# Patient Record
Sex: FEMALE | Race: ASIAN | Employment: FULL TIME | URBAN - METROPOLITAN AREA
[De-identification: names, ages, dates, MRNs, and addresses within clinical notes are randomized per-mention and may not be internally consistent; named-entity substitution may affect disease eponyms.]

---

## 2017-10-20 ENCOUNTER — OFFICE VISIT (OUTPATIENT)
Dept: FAMILY MEDICINE | Facility: CLINIC | Age: 27
End: 2017-10-20
Payer: COMMERCIAL

## 2017-10-20 VITALS
WEIGHT: 125.3 LBS | TEMPERATURE: 97.5 F | HEART RATE: 74 BPM | OXYGEN SATURATION: 100 % | HEIGHT: 63 IN | SYSTOLIC BLOOD PRESSURE: 102 MMHG | BODY MASS INDEX: 22.2 KG/M2 | DIASTOLIC BLOOD PRESSURE: 64 MMHG

## 2017-10-20 DIAGNOSIS — Z23 NEED FOR PROPHYLACTIC VACCINATION AND INOCULATION AGAINST INFLUENZA: ICD-10-CM

## 2017-10-20 DIAGNOSIS — Z00.00 ENCOUNTER FOR ROUTINE ADULT HEALTH EXAMINATION WITHOUT ABNORMAL FINDINGS: Primary | ICD-10-CM

## 2017-10-20 LAB
ANION GAP SERPL CALCULATED.3IONS-SCNC: 7 MMOL/L (ref 3–14)
BUN SERPL-MCNC: 6 MG/DL (ref 7–30)
CALCIUM SERPL-MCNC: 8.5 MG/DL (ref 8.5–10.1)
CHLORIDE SERPL-SCNC: 108 MMOL/L (ref 94–109)
CHOLEST SERPL-MCNC: 138 MG/DL
CO2 SERPL-SCNC: 24 MMOL/L (ref 20–32)
CREAT SERPL-MCNC: 0.57 MG/DL (ref 0.52–1.04)
GFR SERPL CREATININE-BSD FRML MDRD: >90 ML/MIN/1.7M2
GLUCOSE SERPL-MCNC: 77 MG/DL (ref 70–99)
HDLC SERPL-MCNC: 57 MG/DL
LDLC SERPL CALC-MCNC: 64 MG/DL
NONHDLC SERPL-MCNC: 81 MG/DL
POTASSIUM SERPL-SCNC: 4.1 MMOL/L (ref 3.4–5.3)
SODIUM SERPL-SCNC: 139 MMOL/L (ref 133–144)
TRIGL SERPL-MCNC: 87 MG/DL

## 2017-10-20 PROCEDURE — 99395 PREV VISIT EST AGE 18-39: CPT | Mod: 25 | Performed by: NURSE PRACTITIONER

## 2017-10-20 PROCEDURE — 80061 LIPID PANEL: CPT | Performed by: NURSE PRACTITIONER

## 2017-10-20 PROCEDURE — 90686 IIV4 VACC NO PRSV 0.5 ML IM: CPT | Performed by: NURSE PRACTITIONER

## 2017-10-20 PROCEDURE — 90471 IMMUNIZATION ADMIN: CPT | Performed by: NURSE PRACTITIONER

## 2017-10-20 PROCEDURE — 80048 BASIC METABOLIC PNL TOTAL CA: CPT | Performed by: NURSE PRACTITIONER

## 2017-10-20 PROCEDURE — 36415 COLL VENOUS BLD VENIPUNCTURE: CPT | Performed by: NURSE PRACTITIONER

## 2017-10-20 NOTE — PROGRESS NOTES
"   SUBJECTIVE:   CC: Escobar Baron is an 26 year old woman who presents for preventive health visit.     Healthy Habits:    Do you get at least three servings of calcium containing foods daily (dairy, green leafy vegetables, etc.)? {YES/NO, DAIRY INTAKE:822353::\"yes\"}    Amount of exercise or daily activities, outside of work: {AMOUNT EXERCISE:272324}    Problems taking medications regularly {Yes /No default:223749::\"No\"}    Medication side effects: {Yes /No default.:014211::\"No\"}    Have you had an eye exam in the past two years? {YESNOBLANK:060811}    Do you see a dentist twice per year? {YESNOBLANK:543539}    Do you have sleep apnea, excessive snoring or daytime drowsiness?{YESNOBLANK:324787}    {Outside tests to abstract? :108217}    {additional problems to add (Optional):374843}    Today's PHQ-2 Score:   PHQ-2 ( 1999 Pfizer) 10/19/2016 8/21/2015   Q1: Little interest or pleasure in doing things 0 0   Q2: Feeling down, depressed or hopeless 0 0   PHQ-2 Score 0 0     {PHQ-2 LOOK IN ASSESSMENTS (Optional) :416197}  Abuse: Current or Past(Physical, Sexual or Emotional)- {YES/NO/NA:039769}  Do you feel safe in your environment - {YES/NO/NA:814977}    Social History   Substance Use Topics     Smoking status: Never Smoker     Smokeless tobacco: Never Used     Alcohol use No     {ETOH AUDIT:042857}    Reviewed orders with patient.  Reviewed health maintenance and updated orders accordingly - {Yes/No:887434::\"Yes\"}  {Chronicprobdata (Optional):448174}    {Mammo Decision Support (Optional):447859}    Pertinent mammograms are reviewed under the imaging tab.  History of abnormal Pap smear: {PAP HX:859315}    Reviewed and updated as needed this visit by clinical staff         Reviewed and updated as needed this visit by Provider        {HISTORY OPTIONS (Optional):876018}    ROS:  {FEMALE PREVENTATIVE ROS:669923}    OBJECTIVE:   There were no vitals taken for this visit.  EXAM:  {Exam Choices:772428}    ASSESSMENT/PLAN: " "  {Dia Picklist:020122}    COUNSELING:   {FEMALE COUNSELING MESSAGES:152626::\"Reviewed preventive health counseling, as reflected in patient instructions\"}    {BP Counseling- Complete if BP >= 120/80  (Optional):052349}     reports that she has never smoked. She has never used smokeless tobacco.  {Tobacco Cessation -- Complete if patient is a smoker (Optional):253815}  Estimated body mass index is 21.51 kg/(m^2) as calculated from the following:    Height as of 10/19/16: 5' 3\" (1.6 m).    Weight as of 10/19/16: 121 lb 6.4 oz (55.1 kg).   {Weight Management Plan (ACO) Complete if BMI is abnormal-  Ages 18-64  BMI >24.9.  Age 65+ with BMI <23 or >30 (Optional):331869}    Counseling Resources:  ATP IV Guidelines  Pooled Cohorts Equation Calculator  Breast Cancer Risk Calculator  FRAX Risk Assessment  ICSI Preventive Guidelines  Dietary Guidelines for Americans, 2010  USDA's MyPlate  ASA Prophylaxis  Lung CA Screening    ALESSANDRO Mackey Ra [facility]  "

## 2017-10-20 NOTE — MR AVS SNAPSHOT
After Visit Summary   10/20/2017    Escobar Baron    MRN: [id number]           Patient Information     Date Of Birth          1990        Visit Information        Provider Department      10/20/2017 8:00 AM Katja Hein Ra, APRN CNP [facility]        Today's Diagnoses     Encounter for routine adult health examination without abnormal findings    -  1      Care Instructions    Let me know if you decide you would like to proceed with the nexplanon implant for contraception.          Follow-ups after your visit        Who to contact     If you have questions or need follow up information about today's clinic visit or your schedule please contact [facility] directly at [phone].  Normal or non-critical lab and imaging results will be communicated to you by MyChart, letter or phone within 4 business days after the clinic has received the results. If you do not hear from us within 7 days, please contact the clinic through [employer] or phone. If you have a critical or abnormal lab result, we will notify you by phone as soon as possible.  Submit refill requests through [employer] or call your pharmacy and they will forward the refill request to us. Please allow 3 business days for your refill to be completed.          Additional Information About Your Visit        MyChart Information     [employer] gives you secure access to your electronic health record. If you see a primary care provider, you can also send messages to your care team and make appointments. If you have questions, please call your primary care clinic.  If you do not have a primary care provider, please call [phone] and they will assist you.        Care EveryWhere ID     This is your Care EveryWhere ID. This could be used by other organizations to access your [place] medical records  [id number]        Your Vitals Were     Pulse Temperature Height Pulse Oximetry BMI (Body Mass Index)       74 97.5  " F (36.4  C) (Oral) 5' 3.25\" (1.607 m) 100% 22.02 kg/m2        Blood Pressure from Last 3 Encounters:   10/20/17 102/64   10/19/16 102/60   08/21/15 110/70    Weight from Last 3 Encounters:   10/20/17 125 lb 4.8 oz (56.8 kg)   10/19/16 121 lb 6.4 oz (55.1 kg)   08/21/15 119 lb 4.8 oz (54.1 kg)              We Performed the Following     Basic metabolic panel     LIPID REFLEX TO DIRECT LDL PANEL        Primary Care Provider Office Phone # Fax #    Carolyne Turcios [phone]       [address] [facility] [address]        Equal Access to Services     ALMITA CAMACHO : Hadii aad ku hadasho Sorajesh, waaxda luqadaha, qaybta kaalmada adeegyada, kristen mensah . So [facility] [phone].    ATENCIÓN: Si habla español, tiene a almanzar disposición servicios gratuitos de asistencia lingüística. Llame al [phone].    We comply with applicable federal civil rights laws and [place] laws. We do not discriminate on the basis of race, color, national origin, age, disability, sex, sexual orientation, or gender identity.            Thank you!     Thank you for choosing [facility]  for your care. Our goal is always to provide you with excellent care. Hearing back from our patients is one way we can continue to improve our services. Please take a few minutes to complete the written survey that you may receive in the mail after your visit with us. Thank you!             Your Updated Medication List - Protect others around you: Learn how to safely use, store and throw away your medicines at www.disposemymeds.org.      Notice  As of 10/20/2017  8:16 AM    You have not been prescribed any medications.      "

## 2017-10-20 NOTE — PROGRESS NOTES

## 2017-10-20 NOTE — NURSING NOTE
"Chief Complaint   Patient presents with     Physical       Initial /64 (BP Location: Right arm, Cuff Size: Adult Regular)  Pulse 74  Temp 97.5  F (36.4  C) (Oral)  Ht 5' 3.25\" (1.607 m)  Wt 125 lb 4.8 oz (56.8 kg)  SpO2 100%  BMI 22.02 kg/m2 Estimated body mass index is 22.02 kg/(m^2) as calculated from the following:    Height as of this encounter: 5' 3.25\" (1.607 m).    Weight as of this encounter: 125 lb 4.8 oz (56.8 kg).  Medication Reconciliation: complete   Pee Christine CMA      "

## 2017-10-20 NOTE — PROGRESS NOTES
SUBJECTIVE:   CC: Escobar Baron is an 26 year old woman who presents for preventive health visit.     Physical   Annual:     Getting at least 3 servings of Calcium per day::  Yes    Bi-annual eye exam::  Yes    Dental care twice a year::  Yes    Sleep apnea or symptoms of sleep apnea::  None    Diet::  Regular (no restrictions) and Other    Frequency of exercise::  None    Taking medications regularly::  Yes    Medication side effects::  Not applicable    Additional concerns today::  No      No concerns.  Last year discussed nexplanon.  Still interested, but still deciding if they want one more child.        Today's PHQ-2 Score: PHQ-2 (  Pfizer) 10/20/2017   Q1: Little interest or pleasure in doing things 0   Q2: Feeling down, depressed or hopeless 0   PHQ-2 Score 0   Q1: Little interest or pleasure in doing things Not at all   Q2: Feeling down, depressed or hopeless Not at all   PHQ-2 Score 0       Abuse: Current or Past(Physical, Sexual or Emotional)- No  Do you feel safe in your environment - Yes    Social History   Substance Use Topics     Smoking status: Never Smoker     Smokeless tobacco: Never Used     Alcohol use No     The patient does not drink >3 drinks per day nor >7 drinks per week.    Reviewed orders with patient.  Reviewed health maintenance and updated orders accordingly - Yes  Patient Active Problem List   Diagnosis     Migraine headache     Carrier of group B Streptococcus     Vitamin D deficiency     CARDIOVASCULAR SCREENING; LDL GOAL LESS THAN 160     Encounter for supervision of other normal pregnancy     Indication for care in labor and delivery, antepartum      contractions     Indication for care in labor or delivery     Right lumbar radiculopathy     Past Surgical History:   Procedure Laterality Date     ENT SURGERY      wisdom tooth extraction     NO HISTORY OF SURGERY         Social History   Substance Use Topics     Smoking status: Never Smoker     Smokeless tobacco:  Never Used     Alcohol use No     Family History   Problem Relation Age of Onset     CANCER Sister       age 4 kidney cancer     CANCER Maternal Grandmother      Family History Negative No family hx of              Mammogram not appropriate for this patient based on age.    Pertinent mammograms are reviewed under the imaging tab.  History of abnormal Pap smear: NO - age 21-29 PAP every 3 years recommended    Reviewed and updated as needed this visit by clinical staff         Reviewed and updated as needed this visit by Provider          Review of Systems  C: NEGATIVE for fever, chills, change in weight  I: NEGATIVE for worrisome rashes, moles or lesions  E: NEGATIVE for vision changes or irritation  ENT: NEGATIVE for ear, mouth and throat problems  R: NEGATIVE for significant cough or SOB  B: NEGATIVE for masses, tenderness or discharge  CV: NEGATIVE for chest pain, palpitations or peripheral edema  GI: NEGATIVE for nausea, abdominal pain, heartburn, or change in bowel habits  : NEGATIVE for unusual urinary or vaginal symptoms. Periods are regular.  M: NEGATIVE for significant arthralgias or myalgia  N: NEGATIVE for weakness, dizziness or paresthesias  P: NEGATIVE for changes in mood or affect     OBJECTIVE:   There were no vitals taken for this visit.  Physical Exam  GENERAL: healthy, alert and no distress  EYES: Eyes grossly normal to inspection, PERRL and conjunctivae and sclerae normal  HENT: ear canals and TM's normal, nose and mouth without ulcers or lesions  NECK: no adenopathy, no asymmetry, masses, or scars and thyroid normal to palpation  RESP: lungs clear to auscultation - no rales, rhonchi or wheezes  BREAST: normal without masses, tenderness or nipple discharge and no palpable axillary masses or adenopathy  CV: regular rate and rhythm, normal S1 S2, no S3 or S4, no murmur, click or rub, no peripheral edema and peripheral pulses strong  ABDOMEN: soft, nontender, no hepatosplenomegaly, no masses  "and bowel sounds normal  MS: no gross musculoskeletal defects noted, no edema  SKIN: no suspicious lesions or rashes  NEURO: Normal strength and tone, mentation intact and speech normal  PSYCH: mentation appears normal, affect normal/bright    ASSESSMENT/PLAN:   1. Encounter for routine adult health examination without abnormal findings  26 y.o. Female, here for WWE.    Will schedule if she wishes to have nexplanon placed.  Pt agrees with plan and verbalized understanding.  - LIPID REFLEX TO DIRECT LDL PANEL  - Basic metabolic panel    COUNSELING:  Reviewed preventive health counseling, as reflected in patient instructions       Family planning         reports that she has never smoked. She has never used smokeless tobacco.    Estimated body mass index is 21.51 kg/(m^2) as calculated from the following:    Height as of 10/19/16: 5' 3\" (1.6 m).    Weight as of 10/19/16: 121 lb 6.4 oz (55.1 kg).         Counseling Resources:  ATP IV Guidelines  Pooled Cohorts Equation Calculator  Breast Cancer Risk Calculator  FRAX Risk Assessment  ICSI Preventive Guidelines  Dietary Guidelines for Americans, 2010  USDA's MyPlate  ASA Prophylaxis  Lung CA Screening    ALESSANDRO Mackey Ra, CNP  [facility]swers for HPI/ROS submitted by the patient on 10/20/2017   PHQ-2 Score: 0    "

## 2018-03-15 ENCOUNTER — OFFICE VISIT (OUTPATIENT)
Dept: FAMILY MEDICINE | Facility: CLINIC | Age: 28
End: 2018-03-15
Payer: COMMERCIAL

## 2018-03-15 VITALS
SYSTOLIC BLOOD PRESSURE: 115 MMHG | HEART RATE: 75 BPM | WEIGHT: 128.9 LBS | HEIGHT: 63 IN | OXYGEN SATURATION: 99 % | DIASTOLIC BLOOD PRESSURE: 64 MMHG | RESPIRATION RATE: 19 BRPM | BODY MASS INDEX: 22.84 KG/M2 | TEMPERATURE: 98.7 F

## 2018-03-15 DIAGNOSIS — M25.561 ACUTE PAIN OF RIGHT KNEE: Primary | ICD-10-CM

## 2018-03-15 PROCEDURE — 99213 OFFICE O/P EST LOW 20 MIN: CPT | Performed by: PHYSICIAN ASSISTANT

## 2018-03-15 NOTE — PROGRESS NOTES
"  SUBJECTIVE:   Escobar Baron is a 27 year old female who presents to clinic today for the following health issues:    Musculoskeletal problem/pain      Duration: 3 days     Description  Location: right knee     Intensity:  moderate, severe    Accompanying signs and symptoms: Swelling, pain in knee \"stiff\" feeling     History  Previous similar problem: no   Previous evaluation:  none    Precipitating or alleviating factors:  Trauma or overuse: YES- slid on ice a few weeks ago, not sure if related.    Aggravating factors include: going from sitting to standing, bending it backwards     Therapies tried and outcome: Ibuprofen, not effective     -Patient is a 28yo female with a recent slip on ice (without actual fall) around 2 weeks ago (but no further pain that day)  -now with new onset swelling and pain in that knee  -She notes when moving from sitting to standing it is painful  -increased pain when knees are bent  -not painful with walking; Stairs are \"horrible\" (feels like cant put pressure)  -unsure about a specific location of pain  -started ibuprofen yesterday; did start icing as well    Problem list and histories reviewed & adjusted, as indicated.  Additional history: as documented    Patient Active Problem List   Diagnosis     Migraine headache     Carrier of group B Streptococcus     Vitamin D deficiency     CARDIOVASCULAR SCREENING; LDL GOAL LESS THAN 160     Encounter for supervision of other normal pregnancy     Indication for care in labor and delivery, antepartum      contractions     Indication for care in labor or delivery     Right lumbar radiculopathy     Past Surgical History:   Procedure Laterality Date     ENT SURGERY  2010    wisdom tooth extraction     NO HISTORY OF SURGERY         Social History   Substance Use Topics     Smoking status: Never Smoker     Smokeless tobacco: Never Used     Alcohol use No     Family History   Problem Relation Age of Onset     CANCER Sister       " "age 4 kidney cancer     CANCER Maternal Grandmother      Family History Negative No family hx of            Reviewed and updated as needed this visit by clinical staff       Reviewed and updated as needed this visit by Provider         ROS:  Constitutional, HEENT, cardiovascular, pulmonary, gi and gu systems are negative, except as otherwise noted.    OBJECTIVE:     /64 (BP Location: Right arm, Patient Position: Chair, Cuff Size: Adult Regular)  Pulse 75  Temp 98.7  F (37.1  C) (Tympanic)  Resp 19  Ht 5' 3\" (1.6 m)  Wt 128 lb 14.4 oz (58.5 kg)  SpO2 99%  BMI 22.83 kg/m2  Body mass index is 22.83 kg/(m^2).  GENERAL: healthy, alert and no distress  MS: No warmth or redness. Mild appreciable synovial swelling along the lateral knee space. This is the area of joint line tenderness as well. No medial joint line tenderness, no ligamentous laxity or cruciate instability.      Diagnostic Test Results:  none     ASSESSMENT/PLAN:   1. Acute pain of right knee  Mild effusion noted with positive meniscal testing lateral. Begin supportive care with nsaid and ice over the next few days. If not improving start physical therapy. Consider ortho eval if this is not fruitful - she will call in this case. No need for follow up appt.   - [facility] PT, HAND, AND CHIROPRACTIC REFERRAL    Dami Green PA-C  [facility]  "

## 2018-03-15 NOTE — MR AVS SNAPSHOT
After Visit Summary   3/15/2018    Escobar Baron    MRN: [id number]           Patient Information     Date Of Birth          1990        Visit Information        Provider Department      3/15/2018 1:00 PM Dami Green PA-C [facility]        Today's Diagnoses     Acute pain of right knee    -  1       Follow-ups after your visit        Additional Services     [facility], AND CH[facility]RACTIC REFERRAL       **This order will print in the [facility] Scheduling Office**    Physical Therapy, Hand Therapy and Chiropractic Care are available through:    *[facility]  *[facility]  *[facility]    Call one number to schedule at any of the above locations: [phone].    Your provider has referred you to: Physical Therapy at [facility] or [facility]    Indication/Reason for Referral: Knee Pain  Onset of Illness: acute  Therapy Orders: Evaluate and Treat  Special Programs: None  Special Request: None    Alethea Claire      Additional Comments for the Therapist or Chiropractor:     Please be aware that coverage of these services is subject to the terms and limitations of your health insurance plan.  Call member services at your health plan with any benefit or coverage questions.      Please bring the following to your appointment:    *Your personal calendar for scheduling future appointments  *Comfortable clothing                  Who to contact     If you have questions or need follow up information about today's clinic visit or your schedule please contact [facility] directly at [phone].  Normal or non-critical lab and imaging results will be communicated to you by MyChart, letter or phone within 4 business days after the clinic has received the results. If you do not hear from us within 7 days, please contact the clinic through MyChart or phone. If you have a critical or abnormal lab result, we will notify you by phone  "as soon as possible.  Submit refill requests through [employer] or call your pharmacy and they will forward the refill request to us. Please allow 3 business days for your refill to be completed.          Additional Information About Your Visit        [employer] Information     [employer] gives you secure access to your electronic health record. If you see a primary care provider, you can also send messages to your care team and make appointments. If you have questions, please call your primary care clinic.  If you do not have a primary care provider, please call [phone] and they will assist you.        Care EveryWhere ID     This is your Care EveryWhere ID. This could be used by other organizations to access your [place] medical records  [id number]        Your Vitals Were     Pulse Temperature Respirations Height Pulse Oximetry BMI (Body Mass Index)    75 98.7  F (37.1  C) (Tympanic) 19 5' 3\" (1.6 m) 99% 22.83 kg/m2       Blood Pressure from Last 3 Encounters:   03/15/18 115/64   10/20/17 102/64   10/19/16 102/60    Weight from Last 3 Encounters:   03/15/18 128 lb 14.4 oz (58.5 kg)   10/20/17 125 lb 4.8 oz (56.8 kg)   10/19/16 121 lb 6.4 oz (55.1 kg)              We Performed the Following     [facility], AND CH[facility]RACTIC REFERRAL        Primary Care Provider Office Phone # Fax #    Katja ALESSANDRO [place] [facility] [phone]       [address] [facility] [address]        Equal Access to Services     [facility] Hospital[facility] AH: Hadii aad ku hadasho Soomaali, waaxda luqadaha, qaybta kaalmada adeegyada, kristen mensah . So [facility] [phone].    ATENCIÓN: Si habla español, tiene a almanzar disposición servicios gratuitos de asistencia lingüística. Llame al [phone].    We comply with applicable federal civil rights laws and [place] laws. We do not discriminate on the basis of race, color, national origin, age, disability, sex, sexual orientation, or gender identity.            Thank you!  "    Thank you for choosing [facility]  for your care. Our goal is always to provide you with excellent care. Hearing back from our patients is one way we can continue to improve our services. Please take a few minutes to complete the written survey that you may receive in the mail after your visit with us. Thank you!             Your Updated Medication List - Protect others around you: Learn how to safely use, store and throw away your medicines at www.disposemymeds.org.          This list is accurate as of 3/15/18  1:28 PM.  Always use your most recent med list.                   Brand Name Dispense Instructions for use Diagnosis    ALLEGRA PO

## 2018-03-27 ENCOUNTER — THERAPY VISIT (OUTPATIENT)
Dept: PHYSICAL THERAPY | Facility: CLINIC | Age: 28
End: 2018-03-27
Payer: COMMERCIAL

## 2018-03-27 DIAGNOSIS — M25.561 ACUTE PAIN OF RIGHT KNEE: Primary | ICD-10-CM

## 2018-03-27 PROCEDURE — 97161 PT EVAL LOW COMPLEX 20 MIN: CPT | Mod: GP | Performed by: PHYSICAL THERAPIST

## 2018-03-27 PROCEDURE — 97110 THERAPEUTIC EXERCISES: CPT | Mod: GP | Performed by: PHYSICAL THERAPIST

## 2018-03-27 ASSESSMENT — ACTIVITIES OF DAILY LIVING (ADL)
HOW_WOULD_YOU_RATE_THE_CURRENT_FUNCTION_OF_YOUR_KNEE_DURING_YOUR_USUAL_DAILY_ACTIVITIES_ON_A_SCALE_FROM_0_TO_100_WITH_100_BEING_YOUR_LEVEL_OF_KNEE_FUNCTION_PRIOR_TO_YOUR_INJURY_AND_0_BEING_THE_INABILITY_TO_PERFORM_ANY_OF_YOUR_USUAL_DAILY_ACTIVITIES?: 70
PAIN: THE SYMPTOM AFFECTS MY ACTIVITY SLIGHTLY
STAND: ACTIVITY IS MINIMALLY DIFFICULT
GIVING WAY, BUCKLING OR SHIFTING OF KNEE: I DO NOT HAVE THE SYMPTOM
WEAKNESS: THE SYMPTOM AFFECTS MY ACTIVITY MODERATELY
RISE FROM A CHAIR: ACTIVITY IS MINIMALLY DIFFICULT
KNEEL ON THE FRONT OF YOUR KNEE: ACTIVITY IS SOMEWHAT DIFFICULT
HOW_WOULD_YOU_RATE_THE_OVERALL_FUNCTION_OF_YOUR_KNEE_DURING_YOUR_USUAL_DAILY_ACTIVITIES?: ABNORMAL
KNEE_ACTIVITY_OF_DAILY_LIVING_SCORE: 50
RAW_SCORE: 35
KNEE_ACTIVITY_OF_DAILY_LIVING_SUM: 35
GO UP STAIRS: ACTIVITY IS VERY DIFFICULT
AS_A_RESULT_OF_YOUR_KNEE_INJURY,_HOW_WOULD_YOU_RATE_YOUR_CURRENT_LEVEL_OF_DAILY_ACTIVITY?: NORMAL
SWELLING: THE SYMPTOM AFFECTS MY ACTIVITY SEVERELY
SIT WITH YOUR KNEE BENT: ACTIVITY IS VERY DIFFICULT
WALK: ACTIVITY IS SOMEWHAT DIFFICULT
STIFFNESS: I HAVE THE SYMPTOM BUT IT DOES NOT AFFECT MY ACTIVITY
SQUAT: ACTIVITY IS VERY DIFFICULT
LIMPING: THE SYMPTOM AFFECTS MY ACTIVITY MODERATELY
GO DOWN STAIRS: ACTIVITY IS VERY DIFFICULT

## 2018-03-27 NOTE — MR AVS SNAPSHOT
After Visit Summary   3/27/2018    Escobar Baron    MRN: [id number]           Patient Information     Date Of Birth          1990        Visit Information        Provider Department      3/27/2018 6:30 AM Starla Ramsay PT [facility] [place] [facility]        Today's Diagnoses     Acute pain of right knee    -  1       Follow-ups after your visit        Your next 10 appointments already scheduled     Apr 03, 2018  7:10 AM CDT   SALBADOR Extremity with Starla Ramsay PT   [facility] [place] [facility] ([facility] [place])    [address]  [place] [address] [phone]              Who to contact     If you have questions or need follow up information about today's clinic visit or your schedule please contact [facility] directly at [phone].  Normal or non-critical lab and imaging results will be communicated to you by MyChart, letter or phone within 4 business days after the clinic has received the results. If you do not hear from us within 7 days, please contact the clinic through MyChart or phone. If you have a critical or abnormal lab result, we will notify you by phone as soon as possible.  Submit refill requests through [employer] or call your pharmacy and they will forward the refill request to us. Please allow 3 business days for your refill to be completed.          Additional Information About Your Visit        MyChart Information     [employer] gives you secure access to your electronic health record. If you see a primary care provider, you can also send messages to your care team and make appointments. If you have questions, please call your primary care clinic.  If you do not have a primary care provider, please call [phone] and they will assist you.        Care EveryWhere ID     This is your Care EveryWhere ID. This could be used by other organizations to access your [facility] medical records  [id number]          Blood Pressure from Last 3 Encounters:   03/15/18 115/64   10/20/17 102/64   10/19/16 102/60    Weight from Last 3 Encounters:   03/15/18 58.5 kg (128 lb 14.4 oz)   10/20/17 56.8 kg (125 lb 4.8 oz)   10/19/16 55.1 kg (121 lb 6.4 oz)              We Performed the Following     HC PT EVAL, LOW COMPLEXITY     SALBADOR INITIAL EVAL REPORT     THERAPEUTIC EXERCISES        Primary Care Provider Office Phone # Fax #    Katja De Dios Angel Luis, APRN [facility] [phone]       [address]  [facility] [address]        Equal Access to Services     [facility] JANICE : Hadii aad ku hadasho Soomaali, waaxda luqadaha, qaybta kaalmada adeegyada, kristen mensah . So [facility] [phone].    ATENCIÓN: Si habla español, tiene a almanzar disposición servicios gratuitos de asistencia lingüística. Llame al [phone].    We comply with applicable federal civil rights laws and [place] laws. We do not discriminate on the basis of race, color, national origin, age, disability, sex, sexual orientation, or gender identity.            Thank you!     Thank you for choosing [facility]  for your care. Our goal is always to provide you with excellent care. Hearing back from our patients is one way we can continue to improve our services. Please take a few minutes to complete the written survey that you may receive in the mail after your visit with us. Thank you!             Your Updated Medication List - Protect others around you: Learn how to safely use, store and throw away your medicines at www.disposemymeds.org.          This list is accurate as of 3/27/18  1:54 PM.  Always use your most recent med list.                   Brand Name Dispense Instructions for use Diagnosis    ALLEGRA PO

## 2018-03-27 NOTE — PROGRESS NOTES
[facility] Initial Evaluation  Subjective:  HPI Comments: Pt has history of LBP with associated sciatica down the R LE.      Patient is a 27 year old female presenting with rehab right knee hpi. The history is provided by the patient. No  was used.   Escobar Baron is a 27 year old female with a right knee condition.  Condition occurred with:  Insidious onset.  Condition occurred: for unknown reasons.  This is a new condition  2 weeks ago.    Patient reports pain:  Anterior, posterior and lateral.  Radiates to:  Low back.  Pain is described as shooting and is constant and reported as 7/10.  Associated symptoms:  Edema, loss of strength and loss of motion/stiffness. Pain is the same all the time.  Symptoms are exacerbated by descending stairs, ascending stairs, walking, bending/squatting, certain positions and kneeling and relieved by ice and NSAID's.  Since onset symptoms are unchanged.        General health as reported by patient is good.  Pertinent medical history includes:  None.  Medical allergies: no.  Other surgeries include:  None reported.  Current medications:  None as reported by the patient.  Current occupation is Works for insurance company, on weekends works as a .  Patient is working in normal job without restrictions.  Primary job tasks include:  Prolonged sitting.    Barriers include:  Stairs.    Red flags:  None as reported by the patient.                        Objective:  System                                           Hip Evaluation    Hip Strength:    Flexion:   Left: 5/5   Pain:  Right: 5/5   Pain:                    Extension:  Left: 5/5  Pain:Right: 5/5    Pain:    Abduction:  Left: 5/5     Pain:Right: 4+/5    Pain:    Internal Rotation:  Left: 5/5    Pain:Right: 5/5   Pain:  External Rotation:  Left: 5/5   Pain:  Right: 5-/5   Pain:                     Knee Evaluation:  ROM:    AROM    Hyperextension:  Left:  3    Right: 1    Flexion:  Left: 125   Right:        Strength:     Extension:  Left: 5/5   Pain:      Right: 5/5   Pain:  Flexion:  Left: 5/5   Pain:      Right: 5-/5   Pain:        Ligament Testing:    Varus 0:  Right:  Neg  Varus 30:  Right:  Pos  Valgus 0:  Right:  Neg  Valgus 30:  Right:  Neg  Anterior Drawer:  Right:  Neg  Posterior Drawer: Right:  Neg  Lachman's:  Right:  Neg  Special Tests:       Right knee negative for the following special tests:  Meniscal              General     ROS    Assessment/Plan:    Patient is a 27 year old female with right side knee complaints.    Patient has the following significant findings with corresponding treatment plan.                Diagnosis 1:  Right knee pain  Pain -  hot/cold therapy, US, manual therapy, splint/taping/bracing/orthotics, education and home program  Decreased strength - therapeutic exercise, therapeutic activities and home program  Inflammation - cold therapy and self management/home program  Impaired gait - gait training and home program  Impaired muscle performance - neuro re-education and home program  Decreased function - therapeutic activities and home program    Therapy Evaluation Codes:   1) History comprised of:   Personal factors that impact the plan of care:      None.    Comorbidity factors that impact the plan of care are:      None.     Medications impacting care: None.  2) Examination of Body Systems comprised of:   Body structures and functions that impact the plan of care:      Knee.   Activity limitations that impact the plan of care are:      Squatting/kneeling and Stairs.  3) Clinical presentation characteristics are:    Stable/Uncomplicated.  4) Decision-Making    Moderate complexity using standardized patient assessment instrument and/or measureable assessment of functional outcome.  Cumulative Therapy Evaluation is: Low complexity.    Previous and current functional limitations:  (See Goal Flow Sheet for this information)    Short term and Long term goals:  (See Goal Flow Sheet for this information)     Communication ability:  Patient appears to be able to clearly communicate and understand verbal and written communication and follow directions correctly.  Treatment Explanation - The following has been discussed with the patient:   RX ordered/plan of care  Anticipated outcomes  Possible risks and side effects  This patient would benefit from PT intervention to resume normal activities.   Rehab potential is good.    Frequency:  1 X week, once daily  Duration:  for 3 weeks tapering to 2 X a month over 4-6 weeks  Discharge Plan:  Achieve all LTG.  Independent in home treatment program.  Reach maximal therapeutic benefit.    Please refer to the daily flowsheet for treatment today, total treatment time and time spent performing 1:1 timed codes.

## 2018-09-10 ENCOUNTER — TELEPHONE (OUTPATIENT)
Dept: FAMILY MEDICINE | Facility: CLINIC | Age: 28
End: 2018-09-10

## 2018-09-10 NOTE — TELEPHONE ENCOUNTER
Panel Management Review      Patient has the following on her problem list: None      Composite cancer screening  Chart review shows that this patient is due/due soon for the following Pap Smear  Summary:    Patient is due/failing the following:   PAP    Action needed:   Patient needs office visit for Pap.    Type of outreach:    Sent [employer] message.    Questions for provider review:    None                                                                                                                                    Ceci Ceballos CMA       Chart routed to Care Team .

## 2018-09-10 NOTE — LETTER
[facility]  [address] [address] [phone]       September 27, 2018    Escobar Baron  [address]  [place] [facility] [address]    Dear Escobar,    We care about your health and have reviewed your health plan and are making recommendations based on this review, to optimize your health.    You are in particular need of attention regarding:  -Cervical Cancer Screening    We are recommending that you:                                                                                                                 -schedule a PAP SMEAR EXAM which is due.  Please disregard this reminder if you have had this exam elsewhere within the last year.  It would be helpful for us to have a copy of your recent pap smear report in our file so that we can best coordinate your care.      If you are under/uninsured, we recommend you contact the [facility]. They offer pap smears at no charge or on a sliding fee charge. You can schedule with them at [phone]. Please have them send us the results.                                                                                              In addition, here is a list of due or overdue Health Maintenance reminders.    Health Maintenance Due   Topic Date Due     Flu Vaccine (1) 09/01/2018     Pap Smear - every 3 years  08/21/2018     Depression Assessment 2 - yearly  10/20/2018       To address the above recommendations, we encourage you to contact us at [phone], via [employer] or by contacting Central Scheduling toll free at [phone] 24 hours a day. They will assist you with finding the most convenient time and location.    Thank you for trusting [facility] and we appreciate the opportunity to serve you.  We look forward to supporting your healthcare needs in the future.    Healthy Regards,    Your [facility] Team

## 2018-10-22 ENCOUNTER — HEALTH MAINTENANCE LETTER (OUTPATIENT)
Age: 28
End: 2018-10-22

## 2018-10-29 ENCOUNTER — OFFICE VISIT (OUTPATIENT)
Dept: FAMILY MEDICINE | Facility: CLINIC | Age: 28
End: 2018-10-29
Payer: COMMERCIAL

## 2018-10-29 VITALS
SYSTOLIC BLOOD PRESSURE: 102 MMHG | BODY MASS INDEX: 23.23 KG/M2 | TEMPERATURE: 98.6 F | RESPIRATION RATE: 15 BRPM | WEIGHT: 131.1 LBS | HEIGHT: 63 IN | DIASTOLIC BLOOD PRESSURE: 60 MMHG | HEART RATE: 72 BPM | OXYGEN SATURATION: 99 %

## 2018-10-29 DIAGNOSIS — G89.29 CHRONIC BILATERAL LOW BACK PAIN WITH RIGHT-SIDED SCIATICA: ICD-10-CM

## 2018-10-29 DIAGNOSIS — Z00.00 ENCOUNTER FOR ROUTINE ADULT HEALTH EXAMINATION WITHOUT ABNORMAL FINDINGS: Primary | ICD-10-CM

## 2018-10-29 DIAGNOSIS — N89.8 VAGINAL DISCHARGE: ICD-10-CM

## 2018-10-29 DIAGNOSIS — Z23 NEED FOR PROPHYLACTIC VACCINATION AND INOCULATION AGAINST INFLUENZA: ICD-10-CM

## 2018-10-29 DIAGNOSIS — M54.41 CHRONIC BILATERAL LOW BACK PAIN WITH RIGHT-SIDED SCIATICA: ICD-10-CM

## 2018-10-29 LAB
SPECIMEN SOURCE: NORMAL
WET PREP SPEC: NORMAL

## 2018-10-29 PROCEDURE — 87210 SMEAR WET MOUNT SALINE/INK: CPT | Performed by: NURSE PRACTITIONER

## 2018-10-29 PROCEDURE — 99213 OFFICE O/P EST LOW 20 MIN: CPT | Mod: 25 | Performed by: NURSE PRACTITIONER

## 2018-10-29 PROCEDURE — 99395 PREV VISIT EST AGE 18-39: CPT | Mod: 25 | Performed by: NURSE PRACTITIONER

## 2018-10-29 PROCEDURE — 90686 IIV4 VACC NO PRSV 0.5 ML IM: CPT | Performed by: NURSE PRACTITIONER

## 2018-10-29 PROCEDURE — 90471 IMMUNIZATION ADMIN: CPT | Performed by: NURSE PRACTITIONER

## 2018-10-29 PROCEDURE — G0145 SCR C/V CYTO,THINLAYER,RESCR: HCPCS | Performed by: NURSE PRACTITIONER

## 2018-10-29 ASSESSMENT — ENCOUNTER SYMPTOMS
JOINT SWELLING: 0
HEARTBURN: 0
CONSTIPATION: 0
PALPITATIONS: 0
WEAKNESS: 0
DIZZINESS: 0
DIARRHEA: 0
NERVOUS/ANXIOUS: 0
EYE PAIN: 0
FEVER: 0
ARTHRALGIAS: 0
PARESTHESIAS: 0
MYALGIAS: 0
ABDOMINAL PAIN: 0
HEMATURIA: 0
HEMATOCHEZIA: 0
COUGH: 0
FREQUENCY: 0
NAUSEA: 0
DYSURIA: 0
HEADACHES: 0
SORE THROAT: 0
SHORTNESS OF BREATH: 0
CHILLS: 0

## 2018-10-29 NOTE — MR AVS SNAPSHOT
After Visit Summary   10/29/2018    Escobar Baron    MRN: [id number]           Patient Information     Date Of Birth          1990        Visit Information        Provider Department      10/29/2018 8:00 AM Katja Hein Ra, [facility]ANDRO [facility]        Today's Diagnoses     Encounter for routine adult health examination without abnormal findings    -  1    Vaginal discharge        Chronic bilateral low back pain with right-sided sciatica        Need for prophylactic vaccination and inoculation against influenza          Care Instructions      Preventive Health Recommendations  Female Ages 26 - 39  Yearly exam:   See your health care provider every year in order to    Review health changes.     Discuss preventive care.      Review your medicines if you your doctor has prescribed any.    Until age 30: Get a Pap test every three years (more often if you have had an abnormal result).    After age 30: Talk to your doctor about whether you should have a Pap test every 3 years or have a Pap test with HPV screening every 5 years.   You do not need a Pap test if your uterus was removed (hysterectomy) and you have not had cancer.  You should be tested each year for STDs (sexually transmitted diseases), if you're at risk.   Talk to your provider about how often to have your cholesterol checked.  If you are at risk for diabetes, you should have a diabetes test (fasting glucose).  Shots: Get a flu shot each year. Get a tetanus shot every 10 years.   Nutrition:     Eat at least 5 servings of fruits and vegetables each day.    Eat whole-grain bread, whole-wheat pasta and brown rice instead of white grains and rice.    Get adequate Calcium and Vitamin D.     Lifestyle    Exercise at least 150 minutes a week (30 minutes a day, 5 days of the week). This will help you control your weight and prevent disease.    Limit alcohol to one drink per day.    No smoking.     Wear sunscreen to prevent  skin cancer.    See your dentist every six months for an exam and cleaning.            Follow-ups after your visit        Additional Services     ORTHO  REFERRAL       [facility] is referring you to the Orthopedic  Services at [facility].       The  Representative will assist you in the coordination of your Orthopedic and Musculoskeletal Care as prescribed by your physician.    The  Representative will call you within 1 business day to help schedule your appointment, or you may contact the  Representative at:    All areas ~ [phone]     Type of Referral : Spine: Lumbar: Medical Spine/Non-Surgical Specialist        Timeframe requested: Routine    Coverage of these services is subject to the terms and limitations of your health insurance plan.  Please call member services at your health plan with any benefit or coverage questions.      If X-rays, CT or MRI's have been performed, please contact the facility where they were done to arrange for , prior to your scheduled appointment.  Please bring this referral request to your appointment and present it to your specialist.                  Who to contact     If you have questions or need follow up information about today's clinic visit or your schedule please contact [facility] directly at [phone].  Normal or non-critical lab and imaging results will be communicated to you by MyChart, letter or phone within 4 business days after the clinic has received the results. If you do not hear from us within 7 days, please contact the clinic through MyChart or phone. If you have a critical or abnormal lab result, we will notify you by phone as soon as possible.  Submit refill requests through [employer] or call your pharmacy and they will forward the refill request to us. Please allow 3 business days for your refill to be completed.          Additional Information  "About Your Visit        MyChart Information     [employer] gives you secure access to your electronic health record. If you see a primary care provider, you can also send messages to your care team and make appointments. If you have questions, please call your primary care clinic.  If you do not have a primary care provider, please call [phone] and they will assist you.        Care EveryWhere ID     This is your Care EveryWhere ID. This could be used by other organizations to access your [facility] [place] medical records  [id number]        Your Vitals Were     Pulse Temperature Respirations Height Last Period Pulse Oximetry    72 98.6  F (37  C) (Oral) 15 5' 3\" (1.6 m) 10/08/2018 (Approximate) 99%    BMI (Body Mass Index)                   23.22 kg/m2            Blood Pressure from Last 3 Encounters:   10/29/18 102/60   03/15/18 115/64   10/20/17 102/64    Weight from Last 3 Encounters:   10/29/18 131 lb 1.6 oz (59.5 kg)   03/15/18 128 lb 14.4 oz (58.5 kg)   10/20/17 125 lb 4.8 oz (56.8 kg)              We Performed the Following     FLU VACCINE, SPLIT VIRUS, IM (QUADRIVALENT) [68955]- >3 YRS     OFFICE/OUTPT VISIT,EST,LEVL III     ORTHO  REFERRAL     Pap imaged thin layer screen reflex to HPV if ASCUS - recommend age 25 - 29     Vaccine Administration, Initial [68818]     Wet prep        Primary Care Provider Office Phone # Fax #    Katja ALESSANDRO [facility] [phone] [address] [facility] [address]        Equal Access to Services     [facility] JANICE : Hadii aad ku hadasho Soomaali, waaxda luqadaha, qaybta kaalmada kristen velazquez. So [facility] [phone].    ATENCIÓN: Si habla español, tiene a almanzar disposición servicios gratuitos de asistencia lingüística. Llame al [phone].    We comply with applicable federal civil rights laws and [place] laws. We do not discriminate on the basis of race, color, national origin, age, disability, sex, sexual " orientation, or gender identity.            Thank you!     Thank you for choosing [facility]  for your care. Our goal is always to provide you with excellent care. Hearing back from our patients is one way we can continue to improve our services. Please take a few minutes to complete the written survey that you may receive in the mail after your visit with us. Thank you!             Your Updated Medication List - Protect others around you: Learn how to safely use, store and throw away your medicines at www.disposemymeds.org.      Notice  As of 10/29/2018  8:42 AM    You have not been prescribed any medications.

## 2018-10-29 NOTE — PROGRESS NOTES
SUBJECTIVE:   CC: Escobar Baron is an 27 year old woman who presents for preventive health visit.     Physical   Annual:     Getting at least 3 servings of Calcium per day:  Yes    Bi-annual eye exam:  Yes    Dental care twice a year:  Yes    Sleep apnea or symptoms of sleep apnea:  None    Diet:  Regular (no restrictions) and Vegetarian/vegan    Frequency of exercise:  None    Taking medications regularly:  Yes    Medication side effects:  Not applicable and Other    Additional concerns today:  No      Vaginal discharge  No pain.  Increased discharge.  Sexually active.  No change in partner.  Uses condoms.  Normal periods.     Back pain  Chronic issue with low back pain and sciatic nerve pain.  Desk job.  Long walks help.  Has completed PT in the past.  Symptoms worsening over the past year.  Symptoms now about 3 times weekly.    Today's PHQ-2 Score:   PHQ-2 (  [employer]) 10/29/2018   Q1: Little interest or pleasure in doing things 0   Q2: Feeling down, depressed or hopeless 0   PHQ-2 Score 0   Q1: Little interest or pleasure in doing things Not at all   Q2: Feeling down, depressed or hopeless Not at all   PHQ-2 Score 0       Abuse: Current or Past(Physical, Sexual or Emotional)- No  Do you feel safe in your environment - Yes    Social History   Substance Use Topics     Smoking status: Never Smoker     Smokeless tobacco: Never Used     Alcohol use No     Alcohol Use 10/29/2018   If you drink alcohol do you typically have greater than 3 drinks per day OR greater than 7 drinks per week? No       Reviewed orders with patient.  Reviewed health maintenance and updated orders accordingly - Yes  Patient Active Problem List   Diagnosis     Migraine headache     Carrier of group B Streptococcus     Vitamin D deficiency     CARDIOVASCULAR SCREENING; LDL GOAL LESS THAN 160     Encounter for supervision of other normal pregnancy     Indication for care in labor and delivery, antepartum      contractions      Indication for care in labor or delivery     Right lumbar radiculopathy     Acute pain of right knee     Past Surgical History:   Procedure Laterality Date     ENT SURGERY  2010    wisdom tooth extraction     NO HISTORY OF SURGERY         Social History   Substance Use Topics     Smoking status: Never Smoker     Smokeless tobacco: Never Used     Alcohol use No     Family History   Problem Relation Age of Onset     Cancer Sister       age 4 kidney cancer     Cancer Maternal Grandmother      Family History Negative No family hx of            Mammogram not appropriate for this patient based on age.    Pertinent mammograms are reviewed under the imaging tab.  History of abnormal Pap smear: NO - age 21-29 PAP every 3 years recommended  PAP / HPV 2015   PAP NIL NIL NIL     Reviewed and updated as needed this visit by clinical staff  Tobacco  Allergies  Meds  Med Hx  Surg Hx  Fam Hx  Soc Hx        Reviewed and updated as needed this visit by Provider        Past Medical History:   Diagnosis Date     Migraine headache       Past Surgical History:   Procedure Laterality Date     ENT SURGERY  2010    wisdom tooth extraction     NO HISTORY OF SURGERY         Review of Systems   Constitutional: Negative for chills and fever.   HENT: Negative for congestion, ear pain, hearing loss and sore throat.    Eyes: Negative for pain and visual disturbance.   Respiratory: Negative for cough and shortness of breath.    Cardiovascular: Negative for chest pain, palpitations and peripheral edema.   Gastrointestinal: Negative for abdominal pain, constipation, diarrhea, heartburn, hematochezia and nausea.   Breasts:  Negative for tenderness and discharge.   Genitourinary: Negative for dysuria, frequency, genital sores, hematuria, pelvic pain, urgency and vaginal bleeding.   Musculoskeletal: Negative for arthralgias, joint swelling and myalgias.   Skin: Negative for rash.   Neurological: Negative for  "dizziness, weakness, headaches and paresthesias.   Psychiatric/Behavioral: Negative for mood changes. The patient is not nervous/anxious.           OBJECTIVE:   /60  Pulse 72  Temp 98.6  F (37  C) (Oral)  Resp 15  Ht 5' 3\" (1.6 m)  Wt 131 lb 1.6 oz (59.5 kg)  LMP 10/08/2018 (Approximate)  SpO2 99%  BMI 23.22 kg/m2  Physical Exam  GENERAL: healthy, alert and no distress  EYES: Eyes grossly normal to inspection, PERRL and conjunctivae and sclerae normal  HENT: ear canals and TM's normal, nose and mouth without ulcers or lesions  NECK: no adenopathy, no asymmetry, masses, or scars and thyroid normal to palpation  RESP: lungs clear to auscultation - no rales, rhonchi or wheezes  BREAST: normal without masses, tenderness or nipple discharge and no palpable axillary masses or adenopathy  CV: regular rate and rhythm, normal S1 S2, no S3 or S4, no murmur, click or rub, no peripheral edema and peripheral pulses strong  ABDOMEN: soft, nontender, no hepatosplenomegaly, no masses and bowel sounds normal   (female): normal female external genitalia, normal urethral meatus, vaginal mucosa pink, moist, well rugated, and normal cervix/adnexa/uterus without masses or discharge  MS: Negative straight leg raises.  SKIN: no suspicious lesions or rashes  NEURO: Normal strength and tone, mentation intact and speech normal  PSYCH: mentation appears normal, affect normal/bright    Diagnostic Test Results:  Results for orders placed or performed in visit on 10/29/18 (from the past 24 hour(s))   Wet prep   Result Value Ref Range    Specimen Description Cervix     Wet Prep No yeast seen     Wet Prep No clue cells seen     Wet Prep No Trichomonas seen        ASSESSMENT/PLAN:   1. Encounter for routine adult health examination without abnormal findings  - Pap imaged thin layer screen reflex to HPV if ASCUS - recommend age 25 - 29    2. Vaginal discharge  Negative wet prep today.  Trial probiotic.  If still symptomatic, will " "return for lab only visit for repeat wet prep.  Pt agrees with plan and verbalized understanding.  - Wet prep  - OFFICE/OUTPT VISIT,EST,LEVL III  - Wet prep; Future    3. Chronic bilateral low back pain with right-sided sciatica  Conservative management with continued symptoms.  Referral placed.  - ORTHO  REFERRAL  - OFFICE/OUTPT VISIT,EST,LEVL III    4. Need for prophylactic vaccination and inoculation against influenza  - FLU VACCINE, SPLIT VIRUS, IM (QUADRIVALENT) [20179]- >3 YRS  - Vaccine Administration, Initial [97437]    COUNSELING:  Reviewed preventive health counseling, as reflected in patient instructions    BP Readings from Last 1 Encounters:   10/29/18 102/60     Estimated body mass index is 23.22 kg/(m^2) as calculated from the following:    Height as of this encounter: 5' 3\" (1.6 m).    Weight as of this encounter: 131 lb 1.6 oz (59.5 kg).           reports that she has never smoked. She has never used smokeless tobacco.      Counseling Resources:  ATP IV Guidelines  Pooled Cohorts Equation Calculator  Breast Cancer Risk Calculator  FRAX Risk Assessment  ICSI Preventive Guidelines  Dietary Guidelines for Americans, 2010  USDA's MyPlate  ASA Prophylaxis  Lung CA Screening    Katja Hein, ALESSANDRO CNP  [facility] [place]  Answers for HPI/ROS submitted by the patient on 10/29/2018   PHQ-2 Score: 0      Injectable Influenza Immunization Documentation    1.  Is the person to be vaccinated sick today?   No    2. Does the person to be vaccinated have an allergy to a component   of the vaccine?   No  Egg Allergy Algorithm Link    3. Has the person to be vaccinated ever had a serious reaction   to influenza vaccine in the past?   No    4. Has the person to be vaccinated ever had Guillain-Barré syndrome?   No    Form completed by patient           "

## 2018-10-31 LAB
COPATH REPORT: NORMAL
PAP: NORMAL

## 2019-04-18 PROBLEM — M25.561 ACUTE PAIN OF RIGHT KNEE: Status: RESOLVED | Noted: 2018-03-27 | Resolved: 2019-04-18

## 2019-08-08 ENCOUNTER — OFFICE VISIT (OUTPATIENT)
Dept: FAMILY MEDICINE | Facility: CLINIC | Age: 29
End: 2019-08-08
Payer: COMMERCIAL

## 2019-08-08 VITALS
OXYGEN SATURATION: 100 % | HEIGHT: 63 IN | TEMPERATURE: 98.6 F | BODY MASS INDEX: 24.45 KG/M2 | HEART RATE: 70 BPM | DIASTOLIC BLOOD PRESSURE: 70 MMHG | SYSTOLIC BLOOD PRESSURE: 102 MMHG | WEIGHT: 138.02 LBS | RESPIRATION RATE: 18 BRPM

## 2019-08-08 DIAGNOSIS — R05.3 PERSISTENT COUGH FOR 3 WEEKS OR LONGER: Primary | ICD-10-CM

## 2019-08-08 DIAGNOSIS — R09.89 THROAT CLEARING: ICD-10-CM

## 2019-08-08 PROCEDURE — 99213 OFFICE O/P EST LOW 20 MIN: CPT | Performed by: PHYSICIAN ASSISTANT

## 2019-08-08 RX ORDER — BENZONATATE 200 MG/1
200 CAPSULE ORAL 3 TIMES DAILY PRN
Qty: 21 CAPSULE | Refills: 0 | Status: SHIPPED | OUTPATIENT
Start: 2019-08-08 | End: 2020-02-04

## 2019-08-08 RX ORDER — OMEPRAZOLE 20 MG/1
20 TABLET, DELAYED RELEASE ORAL DAILY
Qty: 14 TABLET | Refills: 0 | Status: SHIPPED | OUTPATIENT
Start: 2019-08-08 | End: 2020-02-04

## 2019-08-08 ASSESSMENT — MIFFLIN-ST. JEOR: SCORE: 1325.18

## 2019-08-08 NOTE — PROGRESS NOTES
"Subjective     Escobar Baron is a 28 year old female who presents to clinic today for the following health issues:    HPI   RESPIRATORY SYMPTOMS      Duration: 3 weeks     Description  cough    Severity: moderate    Accompanying signs and symptoms: hard time breathing     History (predisposing factors):  none    Precipitating or alleviating factors: None    Therapies tried and outcome:  OTC allergy medication     Escobar is a 27yo female who presents with a cough for the past 3 weeks  Began with a minor cold/fever but only the cough has persisted  Cough is dry  Does feel like she is trying harder to breathe than normal  Does have hx of allergies but has been using allegra for the past week but has not improved symptoms  More sleeping  Cough is throughout the day, at least every hour  She did try some nyquil/dayquil but these were ineffective  Denies santo nasal congestion but has had some heart burn the past 2 days  Lots of throat cleaing      Reviewed and updated as needed this visit by Provider         Review of Systems   ROS COMP: Constitutional, HEENT, cardiovascular, pulmonary, gi and gu systems are negative, except as otherwise noted.      Objective    /70   Pulse 70   Temp 98.6  F (37  C) (Tympanic)   Resp 18   Ht 1.6 m (5' 3\")   Wt 62.6 kg (138 lb 0.3 oz)   SpO2 100%   BMI 24.45 kg/m    Body mass index is 24.45 kg/m .  Physical Exam   GENERAL: healthy, alert and no distress  HENT: ear canals and TM's normal, nose and mouth without ulcers or lesions  RESP: lungs clear to auscultation - no rales, rhonchi or wheezes  CV: regular rate and rhythm, normal S1 S2, no S3 or S4, no murmur, click or rub, no peripheral edema and peripheral pulses strong    Diagnostic Test Results:  Labs reviewed in Epic        Assessment & Plan     1. Persistent cough for 3 weeks or longer  2. Throat clearing  Escobar presents with 3 weeks+ of cough. The lungs sound clear and her sats are excellent She has additionally " noted some GERD symptoms. There is no evidence for PND/Congestion. This may be simple residual viral illness but cannot r/o other etiologies. Trial of ppi and tessalon. Call within 2 weeks if not improving, be seen sooner if worsening.  - omeprazole (PRILOSEC OTC) 20 MG EC tablet; Take 1 tablet (20 mg) by mouth daily  Dispense: 14 tablet; Refill: 0  - benzonatate (TESSALON) 200 MG capsule; Take 1 capsule (200 mg) by mouth 3 times daily as needed for cough  Dispense: 21 capsule; Refill: 0      Return in about 2 weeks (around 8/22/2019) for Call if not improving.    Dami Green PA-C  [facility]

## 2020-02-04 ENCOUNTER — OFFICE VISIT (OUTPATIENT)
Dept: FAMILY MEDICINE | Facility: CLINIC | Age: 30
End: 2020-02-04
Payer: COMMERCIAL

## 2020-02-04 ENCOUNTER — TELEPHONE (OUTPATIENT)
Dept: FAMILY MEDICINE | Facility: CLINIC | Age: 30
End: 2020-02-04

## 2020-02-04 VITALS
HEART RATE: 91 BPM | DIASTOLIC BLOOD PRESSURE: 72 MMHG | SYSTOLIC BLOOD PRESSURE: 96 MMHG | WEIGHT: 139 LBS | TEMPERATURE: 98.4 F | RESPIRATION RATE: 12 BRPM | HEIGHT: 63 IN | BODY MASS INDEX: 24.63 KG/M2 | OXYGEN SATURATION: 98 %

## 2020-02-04 DIAGNOSIS — R05.9 COUGH: ICD-10-CM

## 2020-02-04 DIAGNOSIS — J10.1 INFLUENZA A: Primary | ICD-10-CM

## 2020-02-04 LAB
FLUAV+FLUBV AG SPEC QL: NEGATIVE
FLUAV+FLUBV AG SPEC QL: POSITIVE
SPECIMEN SOURCE: ABNORMAL

## 2020-02-04 PROCEDURE — 99214 OFFICE O/P EST MOD 30 MIN: CPT | Performed by: NURSE PRACTITIONER

## 2020-02-04 PROCEDURE — 87804 INFLUENZA ASSAY W/OPTIC: CPT | Performed by: NURSE PRACTITIONER

## 2020-02-04 RX ORDER — OMEGA-3 FATTY ACIDS/FISH OIL 300-1000MG
200 CAPSULE ORAL EVERY 4 HOURS PRN
COMMUNITY
End: 2023-07-03

## 2020-02-04 RX ORDER — OSELTAMIVIR PHOSPHATE 75 MG/1
75 CAPSULE ORAL 2 TIMES DAILY
Qty: 10 CAPSULE | Refills: 0 | Status: SHIPPED | OUTPATIENT
Start: 2020-02-04 | End: 2020-02-09

## 2020-02-04 RX ORDER — ACETAMINOPHEN 500 MG
500-1000 TABLET ORAL EVERY 6 HOURS PRN
COMMUNITY
End: 2021-07-23

## 2020-02-04 ASSESSMENT — ANXIETY QUESTIONNAIRES
5. BEING SO RESTLESS THAT IT IS HARD TO SIT STILL: NOT AT ALL
1. FEELING NERVOUS, ANXIOUS, OR ON EDGE: NOT AT ALL
6. BECOMING EASILY ANNOYED OR IRRITABLE: NOT AT ALL
GAD7 TOTAL SCORE: 0
7. FEELING AFRAID AS IF SOMETHING AWFUL MIGHT HAPPEN: NOT AT ALL
3. WORRYING TOO MUCH ABOUT DIFFERENT THINGS: NOT AT ALL
2. NOT BEING ABLE TO STOP OR CONTROL WORRYING: NOT AT ALL
IF YOU CHECKED OFF ANY PROBLEMS ON THIS QUESTIONNAIRE, HOW DIFFICULT HAVE THESE PROBLEMS MADE IT FOR YOU TO DO YOUR WORK, TAKE CARE OF THINGS AT HOME, OR GET ALONG WITH OTHER PEOPLE: NOT DIFFICULT AT ALL

## 2020-02-04 ASSESSMENT — PATIENT HEALTH QUESTIONNAIRE - PHQ9
5. POOR APPETITE OR OVEREATING: NOT AT ALL
SUM OF ALL RESPONSES TO PHQ QUESTIONS 1-9: 3

## 2020-02-04 ASSESSMENT — MIFFLIN-ST. JEOR: SCORE: 1324.63

## 2020-02-04 NOTE — PROGRESS NOTES
"Subjective     Escobar Baron is a 29 year old female who presents to clinic today for the following health issues: Cough, Headache, Body Aches    HPI   RESPIRATORY SYMPTOMS      Duration: 3 days    Description  Body aches, headache, fatigue, cough, chills    Severity: severe    Accompanying signs and symptoms: None    History (predisposing factors):  none    Precipitating or alleviating factors: None    Therapies tried and outcome:  Nyquil and Dayquil    Sick Contacts at home. Denies history of asthma.       Reviewed and updated as needed this visit by Provider  Tobacco  Allergies  Meds  Problems  Med Hx  Surg Hx  Fam Hx         Review of Systems   ROS COMP: Constitutional, HEENT, cardiovascular, pulmonary, GI, musculoskeletal, neuro, skin systems are negative, except as otherwise noted.      Objective    BP 96/72 (Cuff Size: Adult Regular)   Pulse 91   Temp 98.4  F (36.9  C) (Oral)   Resp 12   Ht 1.6 m (5' 3\")   Wt 63 kg (139 lb)   SpO2 98%   BMI 24.62 kg/m    Body mass index is 24.62 kg/m .  Physical Exam  Vitals signs and nursing note reviewed.   Constitutional:       Appearance: Normal appearance. She is well-developed.   HENT:      Head: Normocephalic and atraumatic.      Right Ear: Tympanic membrane, ear canal and external ear normal.      Left Ear: Tympanic membrane, ear canal and external ear normal.      Nose: Congestion and rhinorrhea present.      Right Sinus: Frontal sinus tenderness present. No maxillary sinus tenderness.      Left Sinus: Frontal sinus tenderness present. No maxillary sinus tenderness.      Mouth/Throat:      Pharynx: Posterior oropharyngeal erythema present. No oropharyngeal exudate.   Eyes:      Extraocular Movements: Extraocular movements intact.      Conjunctiva/sclera: Conjunctivae normal.      Pupils: Pupils are equal, round, and reactive to light.   Neck:      Musculoskeletal: Normal range of motion and neck supple.   Cardiovascular:      Rate and Rhythm: Normal " rate and regular rhythm.      Heart sounds: Normal heart sounds.   Pulmonary:      Effort: Pulmonary effort is normal.      Breath sounds: Normal breath sounds and air entry.   Lymphadenopathy:      Head:      Right side of head: Submandibular and tonsillar adenopathy present.      Left side of head: Submandibular and tonsillar adenopathy present.      Cervical: Cervical adenopathy present.   Skin:     General: Skin is warm and dry.   Neurological:      Mental Status: She is alert and oriented to person, place, and time.   Psychiatric:         Behavior: Behavior is cooperative.            Results for orders placed or performed in visit on 02/04/20 (from the past 24 hour(s))   Influenza A/B antigen   Result Value Ref Range    Influenza A/B Agn Specimen Nasal     Influenza A Positive (A) NEG^Negative    Influenza B Negative NEG^Negative           Assessment & Plan     Escobar was seen today for headache and cough.    Diagnoses and all orders for this visit:    Influenza A  -     oseltamivir (TAMIFLU) 75 MG capsule; Take 1 capsule (75 mg) by mouth 2 times daily for 5 days    Cough  -     Influenza A/B antigen       Discussed with patient that she was positive for influenza A. Will treat with tamiflu twice a day for 5 days. Additional symptomatic treatment recommendations discussed. Education was added to AVS. Patient was agreeable to plan and verbalized understanding.     Patient Instructions   Tamiflu twice a day for 5 days  Push Fluids  Plenty of rest  Tylenol and ibuprofen to help with pain or fever  Humidified air can help with congestion  Nasal saline or Flonase nasal spray to help with congestion  Salt water gargles, anesthetic throat spray, or lozenges to help with sore throat.   Continue with over the counter treatment as well.       Return in about 1 week (around 2/11/2020) for no improvement or sooner if symptoms worsen .    James Hopson CNP  [facility]

## 2020-02-04 NOTE — TELEPHONE ENCOUNTER
Since Saturday- feels like it is getting worse. Taking ibuprofen, day quil and night quil.   +Body aches   +headache  +cough  Unsure of temp- no thermometer, but feels chills.     Adv can treat symptoms- REST, fluid. Time. Pt would like to be seen. Scheduled in same day.     Bettina JENKINS RN

## 2020-02-04 NOTE — PATIENT INSTRUCTIONS
Tamiflu twice a day for 5 days  Push Fluids  Plenty of rest  Tylenol and ibuprofen to help with pain or fever  Humidified air can help with congestion  Nasal saline or Flonase nasal spray to help with congestion  Salt water gargles, anesthetic throat spray, or lozenges to help with sore throat.   Continue with over the counter treatment as well.

## 2020-02-04 NOTE — TELEPHONE ENCOUNTER
Pt called - wants flu test. Not feeling well since Saturday. Not sure if she has a fever but said she keeps getting the chills and her whole body hurts.    Cb: [phone].  Ok to leave detailed message.    Alexia Calix Patient Representative

## 2020-02-05 ASSESSMENT — ANXIETY QUESTIONNAIRES: GAD7 TOTAL SCORE: 0

## 2020-02-24 ENCOUNTER — HEALTH MAINTENANCE LETTER (OUTPATIENT)
Age: 30
End: 2020-02-24

## 2020-12-13 ENCOUNTER — HEALTH MAINTENANCE LETTER (OUTPATIENT)
Age: 30
End: 2020-12-13

## 2021-04-17 ENCOUNTER — HEALTH MAINTENANCE LETTER (OUTPATIENT)
Age: 31
End: 2021-04-17

## 2021-07-22 ENCOUNTER — NURSE TRIAGE (OUTPATIENT)
Dept: FAMILY MEDICINE | Facility: CLINIC | Age: 31
End: 2021-07-22

## 2021-07-22 NOTE — TELEPHONE ENCOUNTER
"Appt scheduled for tomorrow.   Reason for Disposition    MILD swelling of both ankles (i.e., pedal edema) AND new onset or worsening    Answer Assessment - Initial Assessment Questions  1. ONSET: \"When did the swelling start?\" (e.g., minutes, hours, days)      It started on 7/20/21   2. LOCATION: \"What part of the leg is swollen?\"  \"Are both legs swollen or just one leg?\"      It is just the left foot that is swollen   3. SEVERITY: \"How bad is the swelling?\" (e.g., localized; mild, moderate, severe)   - Localized - small area of swelling localized to one leg   - MILD pedal edema - swelling limited to foot and ankle, pitting edema < 1/4 inch (6 mm) deep, rest and elevation eliminate most or all swelling   - MODERATE edema - swelling of lower leg to knee, pitting edema > 1/4 inch (6 mm) deep, rest and elevation only partially reduce swelling   - SEVERE edema - swelling extends above knee, facial or hand swelling present       Just the left foot   4. REDNESS: \"Does the swelling look red or infected?\"      Seems a little red and itching   5. PAIN: \"Is the swelling painful to touch?\" If so, ask: \"How painful is it?\"   (Scale 1-10; mild, moderate or severe)      7/10 patient cant bend her toes really stiff   6. FEVER: \"Do you have a fever?\" If so, ask: \"What is it, how was it measured, and when did it start?\"       No- just headache   7. CAUSE: \"What do you think is causing the leg swelling?\"      I am not sure   8. MEDICAL HISTORY: \"Do you have a history of heart failure, kidney disease, liver failure, or cancer?\"      No   9. RECURRENT SYMPTOM: \"Have you had leg swelling before?\" If so, ask: \"When was the last time?\" \"What happened that time?\"      Last time it happened when I pregnant   10. OTHER SYMPTOMS: \"Do you have any other symptoms?\" (e.g., chest pain, difficulty breathing)        Tired and have a headache seems some shortness of breath here and there   11. PREGNANCY: \"Is there any chance you are pregnant?\" \"When " "was your last menstrual period?\"        No    Protocols used: LEG SWELLING AND EDEMA-A-OH    Erika Garcia RN on 7/22/2021 at 1:33 PM    "

## 2021-07-23 ENCOUNTER — OFFICE VISIT (OUTPATIENT)
Dept: FAMILY MEDICINE | Facility: CLINIC | Age: 31
End: 2021-07-23
Payer: COMMERCIAL

## 2021-07-23 VITALS
DIASTOLIC BLOOD PRESSURE: 60 MMHG | TEMPERATURE: 98.4 F | HEART RATE: 80 BPM | HEIGHT: 63 IN | OXYGEN SATURATION: 99 % | WEIGHT: 143.6 LBS | SYSTOLIC BLOOD PRESSURE: 106 MMHG | BODY MASS INDEX: 25.45 KG/M2 | RESPIRATION RATE: 16 BRPM

## 2021-07-23 DIAGNOSIS — R09.89 THROAT CLEARING: ICD-10-CM

## 2021-07-23 DIAGNOSIS — R05.3 PERSISTENT COUGH FOR 3 WEEKS OR LONGER: ICD-10-CM

## 2021-07-23 DIAGNOSIS — M79.672 LEFT FOOT PAIN: Primary | ICD-10-CM

## 2021-07-23 PROCEDURE — 99213 OFFICE O/P EST LOW 20 MIN: CPT | Performed by: FAMILY MEDICINE

## 2021-07-23 RX ORDER — OMEPRAZOLE 20 MG/1
20 TABLET, DELAYED RELEASE ORAL DAILY
Qty: 30 TABLET | Refills: 1 | Status: SHIPPED | OUTPATIENT
Start: 2021-07-23 | End: 2022-11-10

## 2021-07-23 ASSESSMENT — ENCOUNTER SYMPTOMS
CONSTITUTIONAL NEGATIVE: 1
NEUROLOGICAL NEGATIVE: 1
ARTHRALGIAS: 1

## 2021-07-23 ASSESSMENT — MIFFLIN-ST. JEOR: SCORE: 1340.5

## 2021-07-23 ASSESSMENT — PAIN SCALES - GENERAL: PAINLEVEL: SEVERE PAIN (6)

## 2021-07-23 NOTE — PROGRESS NOTES
"    Assessment and Plan    (M79.672) Left foot pain  (primary encounter diagnosis)  Comment: suspect some mild MSk strain, f/u 1w if still an issue  Plan: timed ibuporofen and ice x1w    (R05) Persistent cough for 3 weeks or longer  Comment: refilling, uses infrequently  Plan: omeprazole (PRILOSEC OTC) 20 MG EC tablet            (R68.89) Throat clearing  Comment:   Plan: omeprazole (PRILOSEC OTC) 20 MG EC tablet              RTC in 1w prn    Chano Chan MD      Leonie Cody is a 30 year old who presents for the following health issues     HPI     Musculoskeletal problem/pain  Onset/Duration: last Wednesday   Description  Location: foot - left  Joint Swelling: YES- on the top of the foot   Redness: YES- little   Pain: YES- sharp  Warmth: no  Intensity:  7-8/10  Progression of Symptoms:  intermittent  Accompanying signs and symptoms:   Fevers: no  Numbness/tingling/weakness: sciatic nerve \"acting up\"on the left side   Does have headaches for about a 1 week intermittently.    History  Trauma to the area: no  Recent illness:  no  Previous similar problem: no  Previous evaluation:  no  Precipitating or alleviating factors:  Aggravating factors include: standing and walking  Therapies tried and outcome: ice and Ibuprofen    Started a few days ago, seems more of an issue in the evening, less of a problem in the morning.  Also notes that it itches.  Ice and ibuprofen helpful for sx.  No event or injury she links to it starting. No daily meds.    No family history of gout.    Notes that she struggles periodically with heartburn, uses omperazole as needed for this.  Would like refill.    Review of Systems   Constitutional: Negative.    Musculoskeletal: Positive for arthralgias.   Neurological: Negative.             Objective    /60 (BP Location: Right arm, Cuff Size: Adult Regular)   Pulse 80   Temp 98.4  F (36.9  C) (Oral)   Resp 16   Ht 1.6 m (5' 3\")   Wt 65.1 kg (143 lb 9.6 oz)   SpO2 99%   BMI 25.44 " kg/m    Body mass index is 25.44 kg/m .  Physical Exam  Vitals and nursing note reviewed.   Constitutional:       Appearance: Normal appearance.   Musculoskeletal:      Left foot: Swelling present. No crepitus.      Comments: 3rd - 4th L MTP TTP, no pain with movement.  Mild swelling noted over middle-lateral MTPs   Skin:     General: Skin is warm and dry.   Neurological:      General: No focal deficit present.      Mental Status: She is alert and oriented to person, place, and time.

## 2021-09-26 ENCOUNTER — HEALTH MAINTENANCE LETTER (OUTPATIENT)
Age: 31
End: 2021-09-26

## 2021-10-29 ENCOUNTER — VIRTUAL VISIT (OUTPATIENT)
Dept: FAMILY MEDICINE | Facility: CLINIC | Age: 31
End: 2021-10-29
Payer: COMMERCIAL

## 2021-10-29 DIAGNOSIS — R05.9 COUGH: Primary | ICD-10-CM

## 2021-10-29 PROCEDURE — 99213 OFFICE O/P EST LOW 20 MIN: CPT | Mod: 95 | Performed by: NURSE PRACTITIONER

## 2021-10-29 RX ORDER — FLUTICASONE PROPIONATE 50 MCG
1 SPRAY, SUSPENSION (ML) NASAL DAILY
Qty: 16 G | Refills: 1 | Status: SHIPPED | OUTPATIENT
Start: 2021-10-29 | End: 2022-11-10

## 2021-10-29 RX ORDER — BENZONATATE 200 MG/1
200 CAPSULE ORAL 3 TIMES DAILY PRN
Qty: 42 CAPSULE | Refills: 0 | Status: SHIPPED | OUTPATIENT
Start: 2021-10-29 | End: 2022-11-10

## 2021-10-29 RX ORDER — CETIRIZINE HYDROCHLORIDE 10 MG/1
10 TABLET ORAL DAILY
COMMUNITY
End: 2022-11-10

## 2021-10-29 NOTE — PROGRESS NOTES
"Escobar is a 30 year old who is being evaluated via a billable telephone visit.      What phone number would you like to be contacted at? [phone]  How would you like to obtain your AVS? MyChart    Assessment & Plan     Cough  Suspicious for allergic etiology.  Tolerating well.  Tessalon helpful in the past.  Continue antihistamine, add flonase.  If no resolution in 2 weeks, rtc.  - benzonatate (TESSALON) 200 MG capsule; Take 1 capsule (200 mg) by mouth 3 times daily as needed for cough  - fluticasone (FLONASE) 50 MCG/ACT nasal spray; Spray 1 spray into both nostrils daily             BMI:   Estimated body mass index is 25.44 kg/m  as calculated from the following:    Height as of 7/23/21: 1.6 m (5' 3\").    Weight as of 7/23/21: 65.1 kg (143 lb 9.6 oz).           No follow-ups on file.    Katja Hein, ALESSANDRO CNP  [facility]    Leonie Cody is a 30 year old who presents for the following health issues     HPI     Acute Illness  Acute illness concerns: dry cough and has to stop talking to take a breath in between. Can be hoarse.Worse with cold air    Onset/Duration: 1 month.   Symptoms:  Fever: no  Chills/Sweats: no  Headache (location?): yes  Sinus Pressure: no  Conjunctivitis:  no  Ear Pain: no  Rhinorrhea: no  Congestion: no  Sore Throat: YES, itchy  Cough: YES-non-productive  Wheeze: no  Decreased Appetite: no  Nausea: no  Vomiting: no  Diarrhea: no  Dysuria/Freq.: no  Dysuria or Hematuria: no  Fatigue/Achiness: YES, fatigued. Tired than usual  Sick/Strep Exposure: no  Therapies tried and outcome: zyrtec started this week. Tylenol    1 month of itchy, scratchy throat.  Sometimes voice goes out.  No sob or wheezing.  + cough.  Normal intake.  Does not feel it is related to reflux which she has had in the past.  Normal bowels.  Feeling well.  In the past has utilized tessalon with good results, would like to try this.  Just started zyrtec today thinking it might be related to " allergies.    Review of Systems   Constitutional, HEENT, cardiovascular, pulmonary, gi and gu systems are negative, except as otherwise noted.      Objective    Vitals - Patient Reported  Pain Score: No Pain (0)      Vitals:  No vitals were obtained today due to virtual visit.    Physical Exam   healthy, alert and no distress  PSYCH: Alert and oriented times 3; coherent speech, normal   rate and volume, able to articulate logical thoughts, able   to abstract reason, no tangential thoughts, no hallucinations   or delusions  Her affect is normal  RESP: No cough, no audible wheezing, able to talk in full sentences  Remainder of exam unable to be completed due to telephone visits                Phone call duration: 5 minutes

## 2022-02-10 ENCOUNTER — TELEPHONE (OUTPATIENT)
Dept: FAMILY MEDICINE | Facility: CLINIC | Age: 32
End: 2022-02-10

## 2022-02-10 NOTE — LETTER
[facility]  [address] [facility] [address] [phone]       March 1, 2022    Escobar Baron  [address]  [facility] [address]    Dear Escobar,    We care about your health and have reviewed your health plan and are making recommendations based on this review, to optimize your health.    You are in particular need of attention regarding:  -Cervical Cancer Screening  -Wellness (Physical) Visit     We are recommending that you:  -schedule a WELLNESS (Physical) APPOINTMENT with me.   I will check fasting labs the same day - nothing to eat except water and meds for 8-10 hours prior.      -schedule a PAP SMEAR EXAM which is due.  Please disregard this reminder if you have had this exam elsewhere within the last year.  It would be helpful for us to have a copy of your recent pap smear report in our file so that we can best coordinate your care.    If you are under/uninsured, we recommend you contact the [facility]. They offer pap smears at no charge or on a sliding fee charge. You can schedule with them at [phone]. Please have them send us the results.      In addition, here is a list of due or overdue Health Maintenance reminders.    Health Maintenance Due   Topic Date Due     Discuss Advance Care Planning  Never done     COVID-19 Vaccine (1) Never done     Preventive Care Visit  10/29/2019     Flu Vaccine (1) 09/01/2021     PAP Smear  10/29/2021     PHQ-2  01/01/2022       To address the above recommendations, we encourage you to contact us at [phone], via [employer] or by contacting Central Scheduling toll free at [phone] 24 hours a day. They will assist you with finding the most convenient time and location.    Thank you for trusting [facility] and we appreciate the opportunity to serve you.  We look forward to supporting your healthcare needs in the future.    Healthy Regards,    Your [facility]  Team

## 2022-02-10 NOTE — TELEPHONE ENCOUNTER
Patient Quality Outreach    Patient is due for the following:   Cervical Cancer Screening - PAP Needed  Physical  - Due after 10/29/2019  Immunizations  -  Covid and Influenza    NEXT STEPS:   Schedule a yearly physical    Type of outreach:    Sent [employer] message.      Questions for provider review:    None     Rohit Rowe MA

## 2022-03-01 NOTE — TELEPHONE ENCOUNTER
Patient Quality Outreach    Patient is due for the following:   Cervical Cancer Screening - PAP Needed  Physical  - Due after 10/29/2019  Immunizations  -  Covid and Influenza    NEXT STEPS:   Schedule a yearly physical    Type of outreach:    Sent letter.    Next Steps:  Reach out within 90 days via Letter.    Max number of attempts reached: Yes. Will try again in 90 days if patient still on fail list.    Questions for provider review:    None     Rohit Rowe MA

## 2022-05-08 ENCOUNTER — HEALTH MAINTENANCE LETTER (OUTPATIENT)
Age: 32
End: 2022-05-08

## 2022-11-09 ENCOUNTER — TELEPHONE (OUTPATIENT)
Dept: FAMILY MEDICINE | Facility: CLINIC | Age: 32
End: 2022-11-09

## 2022-11-09 NOTE — TELEPHONE ENCOUNTER
Reason for Call:  Same Day Appointment, Requested Provider:      PCP: Katja Hein Ra    Reason for visit: Headaches, low energy     Duration of symptoms: 2 weeks     Have you been treated for this in the past? No    Additional comments:     Can we leave a detailed message on this number? YES    Phone number patient can be reached at: Cell number on file:    Telephone Information:   Mobile [phone]       Best Time:     Call taken on 11/9/2022 at 7:01 AM by Angelica Campbell

## 2022-11-10 ENCOUNTER — OFFICE VISIT (OUTPATIENT)
Dept: FAMILY MEDICINE | Facility: CLINIC | Age: 32
End: 2022-11-10

## 2022-11-10 VITALS
TEMPERATURE: 98.4 F | DIASTOLIC BLOOD PRESSURE: 64 MMHG | BODY MASS INDEX: 24.96 KG/M2 | OXYGEN SATURATION: 100 % | HEART RATE: 71 BPM | SYSTOLIC BLOOD PRESSURE: 120 MMHG | RESPIRATION RATE: 18 BRPM | WEIGHT: 140.9 LBS

## 2022-11-10 DIAGNOSIS — J06.9 UPPER RESPIRATORY TRACT INFECTION, UNSPECIFIED TYPE: Primary | ICD-10-CM

## 2022-11-10 DIAGNOSIS — K21.9 GASTROESOPHAGEAL REFLUX DISEASE WITHOUT ESOPHAGITIS: ICD-10-CM

## 2022-11-10 LAB
FLUAV AG SPEC QL IA: POSITIVE
FLUBV AG SPEC QL IA: NEGATIVE

## 2022-11-10 PROCEDURE — 99213 OFFICE O/P EST LOW 20 MIN: CPT | Performed by: NURSE PRACTITIONER

## 2022-11-10 PROCEDURE — 87804 INFLUENZA ASSAY W/OPTIC: CPT | Performed by: NURSE PRACTITIONER

## 2022-11-10 RX ORDER — FLUTICASONE PROPIONATE 50 MCG
1 SPRAY, SUSPENSION (ML) NASAL DAILY
Qty: 16 G | Refills: 1 | Status: SHIPPED | OUTPATIENT
Start: 2022-11-10 | End: 2023-07-03

## 2022-11-10 RX ORDER — OMEPRAZOLE 20 MG/1
20 TABLET, DELAYED RELEASE ORAL DAILY
Qty: 30 TABLET | Refills: 3 | Status: SHIPPED | OUTPATIENT
Start: 2022-11-10 | End: 2023-07-03

## 2022-11-10 RX ORDER — BENZONATATE 200 MG/1
200 CAPSULE ORAL 3 TIMES DAILY PRN
Qty: 30 CAPSULE | Refills: 0 | Status: SHIPPED | OUTPATIENT
Start: 2022-11-10 | End: 2023-03-14

## 2022-11-10 ASSESSMENT — PAIN SCALES - GENERAL: PAINLEVEL: SEVERE PAIN (6)

## 2022-11-10 NOTE — PROGRESS NOTES
"  Assessment & Plan     Upper respiratory tract infection, unspecified type  Acute; encouraged to increase fluid intake, rest, hot tea with honey, throat lozenges, ibuprofen/tylenol as needed for discomfort/fever. May take tessalon pearls as needed for coughing and Flonase to help with sinus swelling.     - Influenza A & B Antigen - Clinic Collect  - benzonatate (TESSALON) 200 MG capsule; Take 1 capsule (200 mg) by mouth 3 times daily as needed for cough  - fluticasone (FLONASE) 50 MCG/ACT nasal spray; Spray 1 spray into both nostrils daily    Gastroesophageal reflux disease without esophagitis  Chronic stable; continue current management no changes.     - omeprazole (PRILOSEC OTC) 20 MG EC tablet; Take 1 tablet (20 mg) by mouth daily        Return in about 5 days (around 11/15/2022) for if symptoms do not improve and/or worsen.    ALESSANDRO Lui CNP  [facility] ADRIANNA Cody is a 31 year old, presenting for the following health issues:  History of Present Illness      History of Present Illness       Headaches:   Since the patient's last clinic visit, headaches are: worsened  The patient is getting headaches:  Daily  She is not able to do normal daily activities when she has a migraine.  The patient is taking the following rescue/relief medications:  Ibuprofen (Advil, Motrin) and other   Patient states \"I get some relief\" from the rescue/relief medications.   The patient is taking the following medications to prevent migraines:  Other  In the past 4 weeks, the patient has gone to an Urgent Care or Emergency Room 0 times times due to headaches.    She eats 0-1 servings of fruits and vegetables daily.She consumes 1 sweetened beverage(s) daily.She exercises with enough effort to increase her heart rate 9 or less minutes per day.  She exercises with enough effort to increase her heart rate 3 or less days per week.   She is taking medications regularly.       Acute Illness  Acute " illness concerns: Headache/Body aches-tested negative for COVID Sunday and Tuesday. (home tests)  Onset/Duration: 2 weeks ago (started feeling worse on Sunday)  Symptoms:  Fever: YES  Chills/Sweats: YES- On  Monday  Headache (location?): YES  Sinus Pressure: YES  Conjunctivitis:  No  Ear Pain: no  Rhinorrhea: YES  Congestion: YES  Sore Throat: No  Cough: YES  Wheeze: YES  Decreased Appetite: YES  Nausea: YES  Vomiting: No  Diarrhea: No  Dysuria/Freq.: No  Dysuria or Hematuria: No  Fatigue/Achiness: YES  Sick/Strep Exposure: YES- whole office and her kids   Therapies tried and outcome: Day Quill/NyQuill and Ibuprofen     Patient states she has been feeling on and off unwell for the past week but on Sunday noticed symptoms to significantly worsen. States it started with generalized body aches, severe headaches, now having sneezing, runny nose, nonproductive cough. Denies sore throat. Has been taking ibuprofen and nyquil with no relief.     Negative home COVID tests      Review of Systems   Constitutional, HEENT, cardiovascular, pulmonary, gi and gu systems are negative, except as otherwise noted.      Objective    /64 (BP Location: Right arm, Patient Position: Sitting, Cuff Size: Adult Regular)   Pulse 71   Temp 98.4  F (36.9  C) (Oral)   Resp 18   Wt 63.9 kg (140 lb 14.4 oz)   LMP 10/12/2022 (Approximate)   SpO2 100%   BMI 24.96 kg/m    Body mass index is 24.96 kg/m .  Physical Exam   GENERAL: healthy, alert and no distress  HENT: normal cephalic/atraumatic, both ears: clear effusion, nose and mouth without ulcers or lesions, nasal mucosa edematous , rhinorrhea clear, oropharynx clear, oral mucous membranes moist and sinuses: not tender  NECK: no adenopathy, no asymmetry, masses, or scars and thyroid normal to palpation  RESP: lungs clear to auscultation - no rales, rhonchi or wheezes  CV: regular rate and rhythm, normal S1 S2, no S3 or S4, no murmur, click or rub, no peripheral edema and peripheral  pulses strong

## 2023-03-14 ENCOUNTER — TELEPHONE (OUTPATIENT)
Dept: FAMILY MEDICINE | Facility: CLINIC | Age: 33
End: 2023-03-14

## 2023-03-14 ENCOUNTER — OFFICE VISIT (OUTPATIENT)
Dept: FAMILY MEDICINE | Facility: CLINIC | Age: 33
End: 2023-03-14
Payer: COMMERCIAL

## 2023-03-14 VITALS
OXYGEN SATURATION: 98 % | TEMPERATURE: 98.4 F | RESPIRATION RATE: 23 BRPM | DIASTOLIC BLOOD PRESSURE: 72 MMHG | HEART RATE: 78 BPM | HEIGHT: 63 IN | WEIGHT: 141.6 LBS | SYSTOLIC BLOOD PRESSURE: 118 MMHG | BODY MASS INDEX: 25.09 KG/M2

## 2023-03-14 DIAGNOSIS — N92.6 MENSTRUAL CHANGES: Primary | ICD-10-CM

## 2023-03-14 PROCEDURE — 99213 OFFICE O/P EST LOW 20 MIN: CPT | Performed by: NURSE PRACTITIONER

## 2023-03-14 ASSESSMENT — PAIN SCALES - GENERAL: PAINLEVEL: NO PAIN (0)

## 2023-03-14 NOTE — TELEPHONE ENCOUNTER
Pt calls.    She was going to come in.  She took a plan B on February 23.  A week and a 1/2 later she got her period.  Her period was done on 3/7 or 3/8 and then she just got her period back.      She has a really bad headache with her period and she is tired.      She has several questions about this.  Advised to be seen.  Appt scheduled for today.

## 2023-03-14 NOTE — PROGRESS NOTES
"  Assessment & Plan     Menstrual changes  Acute; discussed s/s to be expected when taking plan B; patient requesting pregnancy testing; discussed likelihood. Advised to follow up if spotting/menstration does not improve in the next two weeks. Discussed red flag symptoms and when to seek immediate medical attention.     - HCG qualitative urine; Future         BMI:   Estimated body mass index is 25.09 kg/m  as calculated from the following:    Height as of this encounter: 1.6 m (5' 2.99\").    Weight as of this encounter: 64.2 kg (141 lb 9.6 oz).   Weight management plan: Discussed healthy diet and exercise guidelines      Return in about 2 weeks (around 3/28/2023) for if symptoms do not improve and/or worsen.    ALESSANDRO Lui CNP [facility] ADRIANNA Cody is a 32 year old, presenting for the following health issues:  Abnormal Bleeding Problem      History of Present Illness       Reason for visit:  Period    She eats 0-1 servings of fruits and vegetables daily.She consumes 3 sweetened beverage(s) daily.She exercises with enough effort to increase her heart rate 30 to 60 minutes per day.  She exercises with enough effort to increase her heart rate 3 or less days per week.   She is taking medications regularly.      Patient presents to clinic with concerns after taking plan B. States she had unprotected sex on Feb 18 or 19 took plan B Feb 22 or 23; spotting on March 2nd then menstruated for 3 days a little lighter than her normal; spotting for 2-3 days then another heavier flow for a few days. States the blood was brown with red; states only a few small clots noted. Has been having headaches that are relieved by tylenol and abdominal cramping. Denies every taking plan B in the past. States she is very concerned about pregnancy.         Review of Systems   Constitutional, HEENT, cardiovascular, pulmonary, gi and gu systems are negative, except as otherwise noted.       Objective  " "  /72 (BP Location: Right arm, Patient Position: Sitting, Cuff Size: Adult Regular)   Pulse 78   Temp 98.4  F (36.9  C) (Oral)   Resp 23   Ht 1.6 m (5' 2.99\")   Wt 64.2 kg (141 lb 9.6 oz)   LMP 03/13/2023 (Exact Date)   SpO2 98%   Breastfeeding No   BMI 25.09 kg/m    Body mass index is 25.09 kg/m .  Physical Exam   GENERAL: healthy, alert and no distress  RESP: lungs clear to auscultation - no rales, rhonchi or wheezes  CV: regular rate and rhythm, normal S1 S2, no S3 or S4, no murmur, click or rub, no peripheral edema and peripheral pulses strong              "

## 2023-04-23 ENCOUNTER — HEALTH MAINTENANCE LETTER (OUTPATIENT)
Age: 33
End: 2023-04-23

## 2023-06-02 ENCOUNTER — HEALTH MAINTENANCE LETTER (OUTPATIENT)
Age: 33
End: 2023-06-02

## 2023-07-03 ENCOUNTER — OFFICE VISIT (OUTPATIENT)
Dept: FAMILY MEDICINE | Facility: CLINIC | Age: 33
End: 2023-07-03
Payer: COMMERCIAL

## 2023-07-03 VITALS
OXYGEN SATURATION: 99 % | HEART RATE: 72 BPM | DIASTOLIC BLOOD PRESSURE: 75 MMHG | TEMPERATURE: 98.7 F | SYSTOLIC BLOOD PRESSURE: 107 MMHG | WEIGHT: 143 LBS | HEIGHT: 63 IN | BODY MASS INDEX: 25.34 KG/M2 | RESPIRATION RATE: 18 BRPM

## 2023-07-03 DIAGNOSIS — Z00.00 ROUTINE GENERAL MEDICAL EXAMINATION AT A HEALTH CARE FACILITY: Primary | ICD-10-CM

## 2023-07-03 DIAGNOSIS — Z12.4 CERVICAL CANCER SCREENING: ICD-10-CM

## 2023-07-03 DIAGNOSIS — Z13.1 SCREENING FOR DIABETES MELLITUS: ICD-10-CM

## 2023-07-03 DIAGNOSIS — Z80.41 FAMILY HISTORY OF MALIGNANT NEOPLASM OF OVARY: ICD-10-CM

## 2023-07-03 DIAGNOSIS — Z13.220 LIPID SCREENING: ICD-10-CM

## 2023-07-03 DIAGNOSIS — K21.9 GASTROESOPHAGEAL REFLUX DISEASE WITHOUT ESOPHAGITIS: ICD-10-CM

## 2023-07-03 DIAGNOSIS — E66.3 OVERWEIGHT (BMI 25.0-29.9): ICD-10-CM

## 2023-07-03 LAB
CHOLEST SERPL-MCNC: 178 MG/DL
HBA1C MFR BLD: 5.4 % (ref 0–5.6)
HDLC SERPL-MCNC: 60 MG/DL
LDLC SERPL CALC-MCNC: 103 MG/DL
NONHDLC SERPL-MCNC: 118 MG/DL
TRIGL SERPL-MCNC: 75 MG/DL

## 2023-07-03 PROCEDURE — 87624 HPV HI-RISK TYP POOLED RSLT: CPT | Performed by: PHYSICIAN ASSISTANT

## 2023-07-03 PROCEDURE — 36415 COLL VENOUS BLD VENIPUNCTURE: CPT | Performed by: PHYSICIAN ASSISTANT

## 2023-07-03 PROCEDURE — G0145 SCR C/V CYTO,THINLAYER,RESCR: HCPCS | Performed by: PHYSICIAN ASSISTANT

## 2023-07-03 PROCEDURE — 99213 OFFICE O/P EST LOW 20 MIN: CPT | Mod: 25 | Performed by: PHYSICIAN ASSISTANT

## 2023-07-03 PROCEDURE — 83036 HEMOGLOBIN GLYCOSYLATED A1C: CPT | Performed by: PHYSICIAN ASSISTANT

## 2023-07-03 PROCEDURE — 80061 LIPID PANEL: CPT | Performed by: PHYSICIAN ASSISTANT

## 2023-07-03 PROCEDURE — 99395 PREV VISIT EST AGE 18-39: CPT | Performed by: PHYSICIAN ASSISTANT

## 2023-07-03 ASSESSMENT — ENCOUNTER SYMPTOMS
HEMATOCHEZIA: 0
CHILLS: 0
COUGH: 0
HEMATURIA: 0
ABDOMINAL PAIN: 0
CONSTIPATION: 0

## 2023-07-03 ASSESSMENT — PAIN SCALES - GENERAL: PAINLEVEL: NO PAIN (0)

## 2023-07-03 NOTE — PROGRESS NOTES
SUBJECTIVE:   CC: Escobar is an 32 year old who presents for preventive health visit.       7/3/2023     1:37 PM   Additional Questions   Roomed by Karin Erazo CMA         7/3/2023     1:37 PM   Patient Reported Additional Medications   Patient reports taking the following new medications none     Healthy Habits:     Getting at least 3 servings of Calcium per day:  Yes    Bi-annual eye exam:  Yes    Dental care twice a year:  Yes    Sleep apnea or symptoms of sleep apnea:  None    Diet:  Regular (no restrictions)    Frequency of exercise:  2-3 days/week    Duration of exercise:  15-30 minutes    Taking medications regularly:  Yes    Medication side effects:  Not applicable    Additional concerns today:  No    Notes that she struggles periodically with heartburn and abdominal bloating, uses omperazole as needed for this. Would like refill.    She has 9 and 13 year old sons. Recently decided to start trying for pregnancy again.    Have you ever done Advance Care Planning? (For example, a Health Directive, POLST, or a discussion with a medical provider or your loved ones about your wishes): No, advance care planning information given to patient to review.  Patient plans to discuss their wishes with loved ones or provider.      Social History     Tobacco Use     Smoking status: Never     Smokeless tobacco: Never   Substance Use Topics     Alcohol use: No             7/3/2023     1:36 PM   Alcohol Use   Prescreen: >3 drinks/day or >7 drinks/week? No     Reviewed orders with patient.  Reviewed health maintenance and updated orders accordingly - Yes  Lab work is in process  Labs reviewed in EPIC  BP Readings from Last 3 Encounters:   07/03/23 107/75   03/14/23 118/72   11/10/22 120/64    Wt Readings from Last 3 Encounters:   07/03/23 64.9 kg (143 lb)   03/14/23 64.2 kg (141 lb 9.6 oz)   11/10/22 63.9 kg (140 lb 14.4 oz)                  Patient Active Problem List   Diagnosis     Migraine headache     Carrier of group B  Streptococcus     Family history of malignant neoplasm of ovary     Past Surgical History:   Procedure Laterality Date     ENT SURGERY  2010    wisdom tooth extraction       Social History     Tobacco Use     Smoking status: Never     Smokeless tobacco: Never   Substance Use Topics     Alcohol use: No     Family History   Problem Relation Age of Onset     Hypertension Mother      Hypertension Father      Diabetes Type 2  Father      Cancer Sister          age 4 kidney cancer     Ovarian Cancer Maternal Aunt      Ovarian Cancer Maternal Aunt      Ovarian Cancer Maternal Aunt      Breast Cancer No family hx of      Colon Cancer No family hx of          Current Outpatient Medications   Medication Sig Dispense Refill     omeprazole (PRILOSEC) 20 MG DR capsule Take 1 capsule (20 mg) by mouth daily 90 capsule 0     No Known Allergies  Recent Labs   Lab Test 23  1429 10/20/17  0827 10/19/16  0858   A1C 5.4  --   --    LDL  --  64  --    HDL  --  57  --    TRIG  --  87  --    CR  --  0.57 0.57   GFRESTIMATED  --  >90 >90  Non African American GFR Calc     GFRESTBLACK  --  >90 >90  African American GFR Calc     POTASSIUM  --  4.1 3.5   TSH  --   --  1.33        Breast Cancer Screenin/3/2023     1:36 PM   Breast CA Risk Assessment (FHS-7)   Do you have a family history of breast, colon, or ovarian cancer? No / Unknown       Patient under 40 years of age: Routine Mammogram Screening not recommended.   Pertinent mammograms are reviewed under the imaging tab.    History of abnormal Pap smear:       10/29/2018     8:36 AM 2015    12:00 AM 2011    12:00 AM   PAP / HPV   PAP (Historical) NIL  NIL  NIL      Reviewed and updated as needed this visit by clinical staff   Tobacco  Allergies  Meds  Problems   Surg Hx  Fam Hx          Reviewed and updated as needed this visit by Provider      Problems   Surg Hx  Fam Hx         Past Medical History:   Diagnosis Date     Migraine headache   "    Right lumbar radiculopathy 2015     Vitamin D deficiency 2011    (Problem list name updated by automated process. Provider to review and confirm.)      Past Surgical History:   Procedure Laterality Date     ENT SURGERY  2010    wisdom tooth extraction     OB History    Para Term  AB Living   2 2 2 0 0 2   SAB IAB Ectopic Multiple Live Births   0 0 0 0 2      # Outcome Date GA Lbr William/2nd Weight Sex Delivery Anes PTL Lv   2 Term 14 40w0d 03:34 / 00:28 3.544 kg (7 lb 13 oz) M Vag-Spont EPI  JUNIOR      Name: SAMY DUFF D      Apgar1: 8  Apgar5: 9   1 Term 09 38w0d 12:00 2.693 kg (5 lb 15 oz) M    JUNIOR      Name: Ifeanyi       Review of Systems   Constitutional: Negative for chills.   HENT: Negative for congestion.    Respiratory: Negative for cough.    Cardiovascular: Negative for chest pain.   Gastrointestinal: Negative for abdominal pain, constipation and hematochezia.   Genitourinary: Negative for hematuria.        OBJECTIVE:   /75 (BP Location: Right arm, Patient Position: Sitting, Cuff Size: Adult Large)   Pulse 72   Temp 98.7  F (37.1  C) (Tympanic)   Resp 18   Ht 1.6 m (5' 3\")   Wt 64.9 kg (143 lb)   SpO2 99%   BMI 25.33 kg/m    Physical Exam  GENERAL: healthy, alert and no distress  EYES: Eyes grossly normal to inspection, PERRL and conjunctivae and sclerae normal  HENT: ear canals and TM's normal, nose and mouth without ulcers or lesions  NECK: no adenopathy, no asymmetry, masses, or scars and thyroid normal to palpation  RESP: lungs clear to auscultation - no rales, rhonchi or wheezes  CV: regular rate and rhythm, normal S1 S2, no S3 or S4, no murmur, click or rub, no peripheral edema and peripheral pulses strong  ABDOMEN: soft, nontender, no hepatosplenomegaly, no masses and bowel sounds normal   (female): normal female external genitalia, normal urethral meatus, vaginal mucosa pink, moist, well rugated, and normal cervix/adnexa/uterus " without masses or discharge  MS: no gross musculoskeletal defects noted, no edema  SKIN: no suspicious lesions or rashes  NEURO: Normal strength and tone, mentation intact and speech normal  PSYCH: mentation appears normal, affect normal/bright    Diagnostic Test Results:  Labs reviewed in Epic    ASSESSMENT/PLAN:   Routine general medical examination at a health care facility  Reviewed personal and family history. Reviewed age appropriate screenings. Reviewed healthy BP and BMI ranges. Counseled on lifestyle modifications for optimal mental and physical health.  Discussed age-appropriate health maintenance. Recommended any needed vaccinations. Continue to focus on well balanced diet and exercise. Declines COVID and hepatitis B today.    Cervical cancer screening  - Pap Screen with HPV - recommended age 30 - 65 years    Screening for diabetes mellitus  - Hemoglobin A1c; Future  - Hemoglobin A1c    Lipid screening  She is not fasting today  - Lipid panel reflex to direct LDL Non-fasting; Future  - Lipid panel reflex to direct LDL Non-fasting    Gastroesophageal reflux disease without esophagitis  Notes that she struggles periodically with heartburn and abdominal bloating, uses omperazole as needed for this with symptom improvement. Would like refill. Has not taken omeprazole for a few months and noticing more symptoms. Coffee and alcohol are triggers. Monitor and follow-up if worsening or no improvement.  - omeprazole (PRILOSEC) 20 MG DR capsule; Take 1 capsule (20 mg) by mouth daily    Overweight (BMI 25.0-29.9)  Discussed healthy diet/exercise    Family history of malignant neoplasm of ovary  Reports a history of ovarian cancer in 3 maternal aunts. She does not know if anyone had genetic evaluation or if her mom has had genetic counseling. I would recommend she consider genetic counseling and she will let me know if she would like a referral. She will discuss with her mom.     COUNSELING:  Reviewed preventive  health counseling, as reflected in patient instructions        She reports that she has never smoked. She has never used smokeless tobacco.          KENNY Thomas [facility]

## 2023-07-07 LAB
BKR LAB AP GYN ADEQUACY: NORMAL
BKR LAB AP GYN INTERPRETATION: NORMAL
BKR LAB AP HPV REFLEX: NORMAL
BKR LAB AP PREVIOUS ABNORMAL: NORMAL
PATH REPORT.COMMENTS IMP SPEC: NORMAL
PATH REPORT.COMMENTS IMP SPEC: NORMAL
PATH REPORT.RELEVANT HX SPEC: NORMAL

## 2023-07-11 LAB
HUMAN PAPILLOMA VIRUS 16 DNA: NEGATIVE
HUMAN PAPILLOMA VIRUS 18 DNA: NEGATIVE
HUMAN PAPILLOMA VIRUS FINAL DIAGNOSIS: NORMAL
HUMAN PAPILLOMA VIRUS OTHER HR: NEGATIVE

## 2023-07-30 ENCOUNTER — APPOINTMENT (OUTPATIENT)
Dept: CT IMAGING | Facility: CLINIC | Age: 33
End: 2023-07-30
Attending: EMERGENCY MEDICINE
Payer: COMMERCIAL

## 2023-07-30 ENCOUNTER — HOSPITAL ENCOUNTER (EMERGENCY)
Facility: CLINIC | Age: 33
Discharge: HOME | End: 2023-07-30
Attending: EMERGENCY MEDICINE | Admitting: EMERGENCY MEDICINE
Payer: COMMERCIAL

## 2023-07-30 VITALS
SYSTOLIC BLOOD PRESSURE: 121 MMHG | HEART RATE: 76 BPM | RESPIRATION RATE: 18 BRPM | BODY MASS INDEX: 25.47 KG/M2 | OXYGEN SATURATION: 100 % | WEIGHT: 143.74 LBS | HEIGHT: 63 IN | DIASTOLIC BLOOD PRESSURE: 87 MMHG | TEMPERATURE: 97.8 F

## 2023-07-30 DIAGNOSIS — R10.32 LLQ ABDOMINAL PAIN: ICD-10-CM

## 2023-07-30 DIAGNOSIS — R10.9 LEFT FLANK PAIN: ICD-10-CM

## 2023-07-30 LAB
ALBUMIN UR-MCNC: NEGATIVE MG/DL
ANION GAP SERPL CALCULATED.3IONS-SCNC: 11 MMOL/L (ref 7–15)
APPEARANCE UR: ABNORMAL
BASOPHILS # BLD AUTO: 0.1 10E3/UL (ref 0–0.2)
BASOPHILS NFR BLD AUTO: 1 %
BILIRUB UR QL STRIP: NEGATIVE
BUN SERPL-MCNC: 8.2 MG/DL (ref 6–20)
CALCIUM SERPL-MCNC: 9.3 MG/DL (ref 8.6–10)
CHLORIDE SERPL-SCNC: 102 MMOL/L (ref 98–107)
COLOR UR AUTO: ABNORMAL
CREAT SERPL-MCNC: 0.64 MG/DL (ref 0.51–0.95)
DEPRECATED HCO3 PLAS-SCNC: 26 MMOL/L (ref 22–29)
EOSINOPHIL # BLD AUTO: 0.1 10E3/UL (ref 0–0.7)
EOSINOPHIL NFR BLD AUTO: 1 %
ERYTHROCYTE [DISTWIDTH] IN BLOOD BY AUTOMATED COUNT: 12.3 % (ref 10–15)
GFR SERPL CREATININE-BSD FRML MDRD: >90 ML/MIN/1.73M2
GLUCOSE SERPL-MCNC: 95 MG/DL (ref 70–99)
GLUCOSE UR STRIP-MCNC: NEGATIVE MG/DL
HCG UR QL: NEGATIVE
HCT VFR BLD AUTO: 41.6 % (ref 35–47)
HGB BLD-MCNC: 14.1 G/DL (ref 11.7–15.7)
HGB UR QL STRIP: NEGATIVE
HOLD SPECIMEN: NORMAL
IMM GRANULOCYTES # BLD: 0 10E3/UL
IMM GRANULOCYTES NFR BLD: 0 %
KETONES UR STRIP-MCNC: NEGATIVE MG/DL
LEUKOCYTE ESTERASE UR QL STRIP: ABNORMAL
LYMPHOCYTES # BLD AUTO: 2.4 10E3/UL (ref 0.8–5.3)
LYMPHOCYTES NFR BLD AUTO: 30 %
MCH RBC QN AUTO: 28.8 PG (ref 26.5–33)
MCHC RBC AUTO-ENTMCNC: 33.9 G/DL (ref 31.5–36.5)
MCV RBC AUTO: 85 FL (ref 78–100)
MONOCYTES # BLD AUTO: 0.6 10E3/UL (ref 0–1.3)
MONOCYTES NFR BLD AUTO: 8 %
NEUTROPHILS # BLD AUTO: 5 10E3/UL (ref 1.6–8.3)
NEUTROPHILS NFR BLD AUTO: 60 %
NITRATE UR QL: NEGATIVE
NRBC # BLD AUTO: 0 10E3/UL
NRBC BLD AUTO-RTO: 0 /100
PH UR STRIP: 8 [PH] (ref 5–7)
PLATELET # BLD AUTO: 266 10E3/UL (ref 150–450)
POTASSIUM SERPL-SCNC: 4 MMOL/L (ref 3.4–5.3)
RBC # BLD AUTO: 4.9 10E6/UL (ref 3.8–5.2)
RBC URINE: 1 /HPF
SODIUM SERPL-SCNC: 139 MMOL/L (ref 136–145)
SP GR UR STRIP: 1.02 (ref 1–1.03)
SQUAMOUS EPITHELIAL: 14 /HPF
UROBILINOGEN UR STRIP-MCNC: NORMAL MG/DL
WBC # BLD AUTO: 8.1 10E3/UL (ref 4–11)
WBC URINE: 25 /HPF

## 2023-07-30 PROCEDURE — 80048 BASIC METABOLIC PNL TOTAL CA: CPT | Performed by: EMERGENCY MEDICINE

## 2023-07-30 PROCEDURE — 85025 COMPLETE CBC W/AUTO DIFF WBC: CPT | Performed by: EMERGENCY MEDICINE

## 2023-07-30 PROCEDURE — 87086 URINE CULTURE/COLONY COUNT: CPT | Performed by: EMERGENCY MEDICINE

## 2023-07-30 PROCEDURE — 36415 COLL VENOUS BLD VENIPUNCTURE: CPT | Performed by: EMERGENCY MEDICINE

## 2023-07-30 PROCEDURE — 81025 URINE PREGNANCY TEST: CPT | Performed by: EMERGENCY MEDICINE

## 2023-07-30 PROCEDURE — 81001 URINALYSIS AUTO W/SCOPE: CPT | Performed by: EMERGENCY MEDICINE

## 2023-07-30 PROCEDURE — 99284 EMERGENCY DEPT VISIT MOD MDM: CPT | Mod: 25

## 2023-07-30 PROCEDURE — 74176 CT ABD & PELVIS W/O CONTRAST: CPT

## 2023-07-30 RX ORDER — KETOROLAC TROMETHAMINE 15 MG/ML
15 INJECTION, SOLUTION INTRAMUSCULAR; INTRAVENOUS
Status: DISCONTINUED | OUTPATIENT
Start: 2023-07-30 | End: 2023-07-30 | Stop reason: HOSPADM

## 2023-07-30 RX ORDER — ONDANSETRON 2 MG/ML
4 INJECTION INTRAMUSCULAR; INTRAVENOUS EVERY 30 MIN PRN
Status: DISCONTINUED | OUTPATIENT
Start: 2023-07-30 | End: 2023-07-30 | Stop reason: HOSPADM

## 2023-07-30 RX ORDER — SULFAMETHOXAZOLE/TRIMETHOPRIM 800-160 MG
1 TABLET ORAL 2 TIMES DAILY
Qty: 6 TABLET | Refills: 0 | Status: SHIPPED | OUTPATIENT
Start: 2023-07-30 | End: 2023-08-02

## 2023-07-30 ASSESSMENT — ACTIVITIES OF DAILY LIVING (ADL)
ADLS_ACUITY_SCORE: 37
ADLS_ACUITY_SCORE: 35

## 2023-07-30 NOTE — ED TRIAGE NOTES
"Pt arrives with lower abd. Pain and constipation since Thursday. States she has has intermittent pain since Thursday and  when she woke up this AM it was 10/10 pain. She was able to pass a small amount of stool this AM but states it took a lot of work and was very painful. Pt notes she had her period last month but there is a possibility that she could be pregnant. ABCs intact.     BP (!) 117/92   Pulse 81   Temp 97.8  F (36.6  C) (Temporal)   Resp 20   Ht 1.6 m (5' 3\")   Wt 65.2 kg (143 lb 11.8 oz)   SpO2 100%   BMI 25.46 kg/m         Triage Assessment       Row Name 07/30/23 1159       Triage Assessment (Adult)    Airway WDL WDL       Respiratory WDL    Respiratory WDL WDL       Cardiac WDL    Cardiac WDL WDL       Cognitive/Neuro/Behavioral WDL    Cognitive/Neuro/Behavioral WDL WDL                    "

## 2023-07-30 NOTE — ED PROVIDER NOTES
"  History     Chief Complaint:  Abdominal Pain       HPI   Escobar Baron is a 32 year old female who presents to the ED due to abdominal pain.  The patient notes discomfort in the left abdomen since Thursday.  She feels like her pain has been slowly getting worse.  Today when she woke up it was very intense in the left lower quadrant region.  She had also noted previously a little bit of discomfort in the left upper abdomen but that has resolved and again her left lower quadrant pain is the predominant symptom today.  She states that she has had urinary urgency/frequency today but denies hematuria or dysuria.  She states that she also has had some constipation since Thursday and states that while this has been an issue in the past she has never had this degree of abdominal pain with that.      Independent Historian:    Spouse/Partner - They report and corroborate the above HPI    Review of External Notes:  Reviewed PCP note 7/3/23 - no urinary symptoms at that visit.    Allergies:  No Known Allergies     Physical Exam   Patient Vitals for the past 24 hrs:   BP Temp Temp src Pulse Resp SpO2 Height Weight   07/30/23 1158 (!) 117/92 97.8  F (36.6  C) Temporal 81 20 100 % 1.6 m (5' 3\") 65.2 kg (143 lb 11.8 oz)        Physical Exam  Constitutional: Vital signs reviewed as above.   HENT:    Head: No external signs of trauma noted.   Eyes: Conjunctivae are normal. Pupils are equal, round, and reactive to light.   Cardiovascular:    Normal rate, regular rhythm and normal heart sounds.     Exam reveals no friction rub.     No murmur heard.  Pulmonary/Chest:    Effort normal and breath sounds normal.    No respiratory distress.    There are no wheezes.    There are no rales.   Gastrointestinal:    Soft.    Bowel sounds normal.    There is no distension.    There is focal LLQ  tenderness.    There is no rebound or guarding.   Musculoskeletal:    Normal range of motion.    Normal Tone  There is left CVA tenderness to " percussion  Neurological: Patient is alert and oriented to person, place, and time.   Skin: Skin is warm and dry. Patient is not diaphoretic      Emergency Department Course     Imaging:  Abd/pelvis CT no contrast - Stone Protocol    (Results Pending)      Report per radiology    Laboratory:  Labs Ordered and Resulted from Time of ED Arrival to Time of ED Departure   ROUTINE UA WITH MICROSCOPIC REFLEX TO CULTURE - Abnormal       Result Value    Color Urine Light Yellow      Appearance Urine Slightly Cloudy (*)     Glucose Urine Negative      Bilirubin Urine Negative      Ketones Urine Negative      Specific Gravity Urine 1.020      Blood Urine Negative      pH Urine 8.0 (*)     Protein Albumin Urine Negative      Urobilinogen Urine Normal      Nitrite Urine Negative      Leukocyte Esterase Urine Moderate (*)     RBC Urine 1      WBC Urine 25 (*)     Squamous Epithelials Urine 14 (*)    HCG QUALITATIVE URINE - Normal    hCG Urine Qualitative Negative     BASIC METABOLIC PANEL - Normal    Sodium 139      Potassium 4.0      Chloride 102      Carbon Dioxide (CO2) 26      Anion Gap 11      Urea Nitrogen 8.2      Creatinine 0.64      Calcium 9.3      Glucose 95      GFR Estimate >90     CBC WITH PLATELETS AND DIFFERENTIAL    WBC Count 8.1      RBC Count 4.90      Hemoglobin 14.1      Hematocrit 41.6      MCV 85      MCH 28.8      MCHC 33.9      RDW 12.3      Platelet Count 266      % Neutrophils 60      % Lymphocytes 30      % Monocytes 8      % Eosinophils 1      % Basophils 1      % Immature Granulocytes 0      NRBCs per 100 WBC 0      Absolute Neutrophils 5.0      Absolute Lymphocytes 2.4      Absolute Monocytes 0.6      Absolute Eosinophils 0.1      Absolute Basophils 0.1      Absolute Immature Granulocytes 0.0      Absolute NRBCs 0.0     URINE CULTURE        Procedures       Emergency Department Course & Assessments:             Interventions:  Medications   ketorolac (TORADOL) injection 15 mg (has no administration  in time range)   ondansetron (ZOFRAN) injection 4 mg (has no administration in time range)        Assessments, Independent Interpretation, Consult/Discussion of ManagementTests:  ED Course as of 07/30/23 1445   Sun Jul 30, 2023   1251 I evaluated the patient.       Social Determinants of Health affecting care:  None    Disposition:  Care of the patient was transferred to my colleague Dr. Casey pending CT result.     Impression & Plan    CMS Diagnoses: None    Code Status: No Order    Medical Decision Making:    This 32 year old female presents to the ED due to Abdominal Pain   . Please see the HPI and exam for specifics. A broad differential was considered including infectious etiologies such as urine infection, diverticulitis, bowel obstruction, ureteral stone, etc.    Based on the differential, exam, and any decision tools, the above workup was undertaken. Lab and imaging results were reviewed by me and are notable for urinalysis with many squamous cells but also pyuria and moderate leukocyte esterase in the setting of minimal RBCs.    I offered pain and nausea medication but the patient declined.    CT imaging was ordered and is pending at the time of this dictation.    Likely etiologies for the patient symptoms could include ureteral stone or urinary infection.  A urine culture is pending.  It may be reasonable to wait for urine culture results given the improving symptoms that the patient is having today.  The patient was signed over to my colleague, Dr. Casey for disposition pending CT result.    Critical Care time:  was 0 minutes for this patient excluding procedures.    Diagnosis:    ICD-10-CM    1. LLQ abdominal pain  R10.32       2. Left flank pain  R10.9            Discharge Medications:  New Prescriptions    No medications on file          7/30/2023   Willian Zavala DO Burns, Bradley Joseph, DO  07/30/23 1446

## 2023-07-30 NOTE — ED PROVIDER NOTES
Patient signed out to me by my colleague, Dr. Zavala.  In brief, the patient is a 32-year-old female presents with abdominal pain.  It was located in the left upper and lower quadrants, left upper portion had resolved.  She also noted some urinary urgency/frequency without hematuria or dysuria.  The plan at the time of signout was to follow-up on CT results and discuss possible UTI treatment with the patient versus waiting for culture results.    CT shows no acute findings.  There is a kidney stone located in the left kidney, seemingly not in the location that would cause pain.  I reevaluated the patient she has no further complaints.  I did also discuss the option of antibiotics for UTI versus waiting for culture results and the patient preferred to treat now based on her symptoms.  Prescription for Bactrim sent to her pharmacy of choice.  Patient discharged.     Ronan Casey MD  07/30/23 3292

## 2023-08-01 LAB — BACTERIA UR CULT: NORMAL

## 2023-08-28 ENCOUNTER — TELEPHONE (OUTPATIENT)
Dept: FAMILY MEDICINE | Facility: CLINIC | Age: 33
End: 2023-08-28
Payer: COMMERCIAL

## 2023-08-28 NOTE — TELEPHONE ENCOUNTER
Forms/Letter Request    Type of form/letter:  [facility] physician's report    Have you been seen for this request: Yes 07/03/2023    Do we have the form/letter: Yes:     Who is the form from? Patient    Where did/will the form come from? Patient or family brought in       When is form/letter needed by: 09/01/2023    How would you like the form/letter returned:     Patient Notified form requests are processed in 3-5 business days:Yes    Could we send this information to you in [employer] or would you prefer to receive a phone call?:   Patient would prefer a phone call   Okay to leave a detailed message?: Yes at Cell number on file:    Telephone Information:   Mobile [phone]        Radha Simmons Patient Rep.

## 2023-08-29 NOTE — TELEPHONE ENCOUNTER
Rec'd Childcare Physician's Report from [facility]. Placed in PCP basket for review and signature. Call for  [phone].    Salima Shore  Lead

## 2023-09-25 ENCOUNTER — TELEPHONE (OUTPATIENT)
Dept: FAMILY MEDICINE | Facility: CLINIC | Age: 33
End: 2023-09-25
Payer: COMMERCIAL

## 2023-09-25 NOTE — TELEPHONE ENCOUNTER
Reason for Call:  Appointment Request    Patient requesting this type of appt:  office visit    Requested provider:  noah urena    Reason patient unable to be scheduled: Not within requested timeframe    When does patient want to be seen/preferred time: 3-7 days    Comments: Patient has shoulder pain for two weeks and would like an office visit    Could we send this information to you in [facility] or would you prefer to receive a phone call?:   Patient would prefer a phone call   Okay to leave a detailed message?: Yes at Cell number on file:    Telephone Information:   Mobile [phone]       Call taken on 9/25/2023 at 11:40 AM by Fatuma Hutson   Patient Specific Counseling (Will Not Stick From Patient To Patient): Much improved from last week.  Patient to continue neutrogena T-Sal shampoo. Detail Level: Detailed Patient Specific Counseling (Will Not Stick From Patient To Patient): Looks much better.  Finish abx as ordered.

## 2023-09-26 ENCOUNTER — E-VISIT (OUTPATIENT)
Dept: FAMILY MEDICINE | Facility: CLINIC | Age: 33
End: 2023-09-26
Payer: COMMERCIAL

## 2023-09-26 DIAGNOSIS — R07.9 CHEST PAIN, UNSPECIFIED TYPE: Primary | ICD-10-CM

## 2023-09-26 PROCEDURE — 99207 PR NON-BILLABLE SERV PER CHARTING: CPT | Performed by: PHYSICIAN ASSISTANT

## 2023-09-27 ENCOUNTER — HOSPITAL ENCOUNTER (EMERGENCY)
Facility: CLINIC | Age: 33
Discharge: HOME | End: 2023-09-27
Attending: PHYSICIAN ASSISTANT | Admitting: PHYSICIAN ASSISTANT
Payer: COMMERCIAL

## 2023-09-27 ENCOUNTER — APPOINTMENT (OUTPATIENT)
Dept: MRI IMAGING | Facility: CLINIC | Age: 33
End: 2023-09-27
Attending: EMERGENCY MEDICINE
Payer: COMMERCIAL

## 2023-09-27 ENCOUNTER — APPOINTMENT (OUTPATIENT)
Dept: GENERAL RADIOLOGY | Facility: CLINIC | Age: 33
End: 2023-09-27
Attending: EMERGENCY MEDICINE
Payer: COMMERCIAL

## 2023-09-27 VITALS
TEMPERATURE: 98.6 F | BODY MASS INDEX: 25.34 KG/M2 | HEIGHT: 63 IN | OXYGEN SATURATION: 100 % | WEIGHT: 143 LBS | DIASTOLIC BLOOD PRESSURE: 79 MMHG | HEART RATE: 67 BPM | RESPIRATION RATE: 20 BRPM | SYSTOLIC BLOOD PRESSURE: 116 MMHG

## 2023-09-27 DIAGNOSIS — M54.2 NECK PAIN ON LEFT SIDE: ICD-10-CM

## 2023-09-27 DIAGNOSIS — M54.12 CERVICAL RADICULOPATHY: ICD-10-CM

## 2023-09-27 DIAGNOSIS — R07.9 LEFT-SIDED CHEST PAIN: ICD-10-CM

## 2023-09-27 LAB
ALBUMIN SERPL BCG-MCNC: 4.3 G/DL (ref 3.5–5.2)
ALP SERPL-CCNC: 89 U/L (ref 35–104)
ALT SERPL W P-5'-P-CCNC: 21 U/L (ref 0–50)
ANION GAP SERPL CALCULATED.3IONS-SCNC: 10 MMOL/L (ref 7–15)
AST SERPL W P-5'-P-CCNC: 18 U/L (ref 0–45)
ATRIAL RATE - MUSE: 74 BPM
BASOPHILS # BLD AUTO: 0.1 10E3/UL (ref 0–0.2)
BASOPHILS NFR BLD AUTO: 1 %
BILIRUB SERPL-MCNC: 0.2 MG/DL
BUN SERPL-MCNC: 11.3 MG/DL (ref 6–20)
CALCIUM SERPL-MCNC: 8.6 MG/DL (ref 8.6–10)
CHLORIDE SERPL-SCNC: 105 MMOL/L (ref 98–107)
CREAT SERPL-MCNC: 0.61 MG/DL (ref 0.51–0.95)
D DIMER PPP FEU-MCNC: 0.3 UG/ML FEU (ref 0–0.5)
DEPRECATED HCO3 PLAS-SCNC: 24 MMOL/L (ref 22–29)
DIASTOLIC BLOOD PRESSURE - MUSE: NORMAL MMHG
EGFRCR SERPLBLD CKD-EPI 2021: >90 ML/MIN/1.73M2
EOSINOPHIL # BLD AUTO: 0.1 10E3/UL (ref 0–0.7)
EOSINOPHIL NFR BLD AUTO: 1 %
ERYTHROCYTE [DISTWIDTH] IN BLOOD BY AUTOMATED COUNT: 12.4 % (ref 10–15)
GLUCOSE SERPL-MCNC: 87 MG/DL (ref 70–99)
HCG SERPL QL: NEGATIVE
HCT VFR BLD AUTO: 42.9 % (ref 35–47)
HGB BLD-MCNC: 13.8 G/DL (ref 11.7–15.7)
IMM GRANULOCYTES # BLD: 0 10E3/UL
IMM GRANULOCYTES NFR BLD: 0 %
INTERPRETATION ECG - MUSE: NORMAL
LYMPHOCYTES # BLD AUTO: 2.8 10E3/UL (ref 0.8–5.3)
LYMPHOCYTES NFR BLD AUTO: 31 %
MCH RBC QN AUTO: 27.8 PG (ref 26.5–33)
MCHC RBC AUTO-ENTMCNC: 32.2 G/DL (ref 31.5–36.5)
MCV RBC AUTO: 87 FL (ref 78–100)
MONOCYTES # BLD AUTO: 0.7 10E3/UL (ref 0–1.3)
MONOCYTES NFR BLD AUTO: 7 %
NEUTROPHILS # BLD AUTO: 5.4 10E3/UL (ref 1.6–8.3)
NEUTROPHILS NFR BLD AUTO: 60 %
NRBC # BLD AUTO: 0 10E3/UL
NRBC BLD AUTO-RTO: 0 /100
P AXIS - MUSE: 60 DEGREES
PLATELET # BLD AUTO: 264 10E3/UL (ref 150–450)
POTASSIUM SERPL-SCNC: 3.7 MMOL/L (ref 3.4–5.3)
PR INTERVAL - MUSE: 122 MS
PROT SERPL-MCNC: 7 G/DL (ref 6.4–8.3)
QRS DURATION - MUSE: 76 MS
QT - MUSE: 420 MS
QTC - MUSE: 466 MS
R AXIS - MUSE: 4 DEGREES
RBC # BLD AUTO: 4.96 10E6/UL (ref 3.8–5.2)
SODIUM SERPL-SCNC: 139 MMOL/L (ref 135–145)
SYSTOLIC BLOOD PRESSURE - MUSE: NORMAL MMHG
T AXIS - MUSE: 30 DEGREES
TROPONIN T SERPL HS-MCNC: <6 NG/L
VENTRICULAR RATE- MUSE: 74 BPM
WBC # BLD AUTO: 9 10E3/UL (ref 4–11)

## 2023-09-27 PROCEDURE — 93005 ELECTROCARDIOGRAM TRACING: CPT

## 2023-09-27 PROCEDURE — 71046 X-RAY EXAM CHEST 2 VIEWS: CPT

## 2023-09-27 PROCEDURE — 99285 EMERGENCY DEPT VISIT HI MDM: CPT | Mod: 25

## 2023-09-27 PROCEDURE — 36415 COLL VENOUS BLD VENIPUNCTURE: CPT | Performed by: EMERGENCY MEDICINE

## 2023-09-27 PROCEDURE — 84703 CHORIONIC GONADOTROPIN ASSAY: CPT | Performed by: EMERGENCY MEDICINE

## 2023-09-27 PROCEDURE — 72141 MRI NECK SPINE W/O DYE: CPT

## 2023-09-27 PROCEDURE — 80053 COMPREHEN METABOLIC PANEL: CPT | Performed by: EMERGENCY MEDICINE

## 2023-09-27 PROCEDURE — 84484 ASSAY OF TROPONIN QUANT: CPT | Performed by: EMERGENCY MEDICINE

## 2023-09-27 PROCEDURE — 85004 AUTOMATED DIFF WBC COUNT: CPT | Performed by: EMERGENCY MEDICINE

## 2023-09-27 PROCEDURE — 85379 FIBRIN DEGRADATION QUANT: CPT | Performed by: EMERGENCY MEDICINE

## 2023-09-27 RX ORDER — PREDNISONE 20 MG/1
TABLET ORAL
Qty: 10 TABLET | Refills: 0 | Status: SHIPPED | OUTPATIENT
Start: 2023-09-27 | End: 2023-12-07

## 2023-09-27 ASSESSMENT — ACTIVITIES OF DAILY LIVING (ADL)
ADLS_ACUITY_SCORE: 37

## 2023-09-27 NOTE — TELEPHONE ENCOUNTER
Provider E-Visit time total (minutes): 5 minutes    E-visit is for left arm pain and chest pain - will send to triage for further details and will either need clinic visit or urgent care or ER depending on symptoms.    Shayla Palacios PA-C

## 2023-09-27 NOTE — ED TRIAGE NOTES
Pt c/o 3 weeks of pain in chest wall, left shoulder, left arm and left neck. Also notes that left hand feels numb. Pain is better if pt is upright, much worse if laying down. Didn't sleep last night d/t pain. Yesterday pt became short of breath.      Triage Assessment       Row Name 09/27/23 0987       Triage Assessment (Adult)    Airway WDL WDL       Respiratory WDL    Respiratory WDL --  dyspnea started yesterday       Skin Circulation/Temperature WDL    Skin Circulation/Temperature WDL WDL       Cardiac WDL    Cardiac WDL chest pain       Chest Pain Assessment    Chest Pain Location arm, left;shoulder, left;other (see comments);anterior chest, left  neck on left side    Precipitating Factors --  worse when laying donw

## 2023-09-27 NOTE — ED PROVIDER NOTES
"  History     Chief Complaint:  Chest Pain     The history is provided by the patient.      Escobar Baron is a 32 year old female who presents with neck pain which radiates to her left arm and chest for three weeks. She notes she struggles to sleep but it does not impact her daily activities. She notes when standing up it does not hurt as much as lying on her back. When she lies back her left chest and left arm hurt. She notes movement of her neck does not worsen the pain. No fall or trauma. She had a virtual visit with her doctor. In the ED, her left arm feels weak and her left chest hurts. She notes her pain in her arm and chest together. Yesterday she began feeling pain with inspiration. She adds she developed a subjective fever 5 days ago. She adds she has had some shortness of breath and nausea as well as headaches in the morning. She denies chill, cough, hemoptysis, leg swelling, or vomiting. She notes she may have had a blood clot or been on blood thinner in the past.     Independent Historian:   None - Patient Only    Review of External Notes:   None    Medications:    Omeprazole     Past Medical History:    Migraine headache  Right lumbar radiculopathy  Vitamin D deficiency  Spontaneous vaginal delivery    Past Surgical History:    Laie tooth extraction      Physical Exam   Patient Vitals for the past 24 hrs:   BP Temp Temp src Pulse Resp SpO2 Height Weight   09/27/23 1709 116/79 -- -- 67 -- 100 % -- --   09/27/23 1257 -- -- -- -- -- 100 % -- --   09/27/23 1256 118/81 -- -- 78 -- -- -- --   09/27/23 1255 118/81 -- -- 82 -- 100 % -- --   09/27/23 0937 124/85 98.6  F (37  C) Oral 96 20 100 % 1.6 m (5' 3\") 64.9 kg (143 lb)      Physical Exam  Constitutional: Pleasant. Cooperative.   Eyes: Pupils equally round and reactive  HENT: Head is normal in appearance. Oropharynx is normal with moist mucus membranes.  Cardiovascular: Regular rate and rhythm and without murmurs.  Respiratory: Normal respiratory " effort, lungs are clear bilaterally.  Musculoskeletal: Full ROM of C spine, bilateral upper extremities. 4/5  strength to LUE, 5/5 RUE. 2+ radial pulses. Positive left spurling test.  Skin: Normal, without rash.  Neurologic: Cranial nerves grossly intact, normal cognition, no focal deficits. Alert and oriented x 3. Sensation intact equally to bilateral upper extremities.  Psychiatric: Normal affect.  Nursing notes and vital signs reviewed.    Emergency Department Course   ECG  ECG taken at 1246, ECG read at 1917  Normal sinus rhythm with sinus arrhythmia  Cannot rule out Anterior infarct, age undetermined   Rate 74 bpm. WY interval 122 ms. QRS duration 76 ms. QT/QTc 420/466 ms. P-R-T axes 60 4 30.     Imaging:  Cervical spine MRI w/o contrast   Final Result   IMPRESSION:   1.  No spinal canal or foraminal stenosis. Normal cervical spinal cord signal.            XR Chest 2 Views   Final Result   IMPRESSION: There are no acute infiltrates. The cardiac silhouette is   not enlarged. Pulmonary vasculature is unremarkable.      MARK RUTLEDGE MD            SYSTEM ID:  [id number]         Report per radiology    Laboratory:  Labs Ordered and Resulted from Time of ED Arrival to Time of ED Departure   D DIMER QUANTITATIVE - Normal       Result Value    D-Dimer Quantitative 0.30     COMPREHENSIVE METABOLIC PANEL - Normal    Sodium 139      Potassium 3.7      Carbon Dioxide (CO2) 24      Anion Gap 10      Urea Nitrogen 11.3      Creatinine 0.61      GFR Estimate >90      Calcium 8.6      Chloride 105      Glucose 87      Alkaline Phosphatase 89      AST 18      ALT 21      Protein Total 7.0      Albumin 4.3      Bilirubin Total 0.2     TROPONIN T, HIGH SENSITIVITY - Normal    Troponin T, High Sensitivity <6     HCG QUALITATIVE PREGNANCY - Normal    hCG Serum Qualitative Negative     CBC WITH PLATELETS AND DIFFERENTIAL    WBC Count 9.0      RBC Count 4.96      Hemoglobin 13.8      Hematocrit 42.9      MCV 87      MCH 27.8       MCHC 32.2      RDW 12.4      Platelet Count 264      % Neutrophils 60      % Lymphocytes 31      % Monocytes 7      % Eosinophils 1      % Basophils 1      % Immature Granulocytes 0      NRBCs per 100 WBC 0      Absolute Neutrophils 5.4      Absolute Lymphocytes 2.8      Absolute Monocytes 0.7      Absolute Eosinophils 0.1      Absolute Basophils 0.1      Absolute Immature Granulocytes 0.0      Absolute NRBCs 0.0        Emergency Department Course & Assessments:    Independent Interpretation (X-rays, CTs, rhythm strip):  I personally evaluated the CXR, no obvious PNA, PTX.    Assessments/Consultations/Discussion of Management or Tests:  ED Course as of 09/27/23 2358   Wed Sep 27, 2023   1606 I obtained the patient's history and examined as noted above.    1803 I rechecked the patient.   1909 I rechecked the patient and explained findings.      Social Determinants of Health affecting care:   None    Disposition:  The patient was discharged to home.     Impression & Plan      Medical Decision Making:  Escobar Baron is a 32 year old female who presents to the ED for evaluation of left arm, chest, and neck pain.  See HPI as above for additional details.  Vitals and physical exam as above.  Differential is broad and included C-spine fracture, radiculopathy from possible disc pathology, vascular abnormality to arm, shingles, atypical ACS, dissection, PE, pneumothorax, MSK, pericarditis, amongst others.  Given broad differential, broad work-up obtained as above.  ECG without suggestion for pericarditis or other life-threatening arrhythmia.  Chest x-ray is reassuring.  D-dimer is negative, making PE less likely.  Troponin is undetectable, making ACS less likely.  Remainder of the lab work as above.  MRI of C-spine was ordered as well.  This does reveal evidence for minimal posterior disc bulge at C5-6, which may be etiology of patient's symptoms. Low suspicion for alternative nefarious pathology such as those on  differential at this time based upon work up as above. Will trial course of prednisone as below. Discussed Tylenol and ibuprofen as well. Follow up with ortho with persistent symptoms. Discussed reasons to return. All questions answered. Patient discharged to home in stable condition.    Diagnosis:    ICD-10-CM    1. Neck pain on left side  M54.2       2. Cervical radiculopathy  M54.12       3. Left-sided chest pain  R07.9          Discharge Medications:  Discharge Medication List as of 9/27/2023  7:20 PM        START taking these medications    Details   predniSONE (DELTASONE) 20 MG tablet Take two tablets (= 40mg) each day for 5 (five) days, Disp-10 tablet, R-0, E-Prescribe            Scribe Disclosure:  I, Carmen Ashley, am serving as a scribe at 4:13 PM on 9/27/2023 to document services personally performed by Mathew Boone PA-C based on my observations and the provider's statements to me.      9/27/2023   Mathew Boone PA-C     This record was created at least in part using electronic voice recognition software, so please excuse any typographical errors.       Mathew Boone PA-C  09/27/23 7404

## 2023-09-27 NOTE — ED NOTES
"PIT/Triage Evaluation    Patient is a 32 year old female presented with arm and chest pain. Escobar describes left arm pain for the past three weeks accompanied by some shooting pains and left hand numbness. She also notes left-sided chest pain, neck pain, and back pain onset last week. She notes chest pain on inhalation and that it is the most severe when she is trying to sleep. She also reports low-grade fevers, intermittent nausea, and intermittent lightheadedness for the past two weeks. She denies leg edema. She notes chronic shooting pain in her left leg. Escobar denies a cardiac or blood clot history. She denies a history of breathing issues.     Exam is notable for:    Patient Vitals for the past 24 hrs:   BP Temp Temp src Pulse Resp SpO2 Height Weight   09/27/23 1255 118/81 -- -- 82 -- 100 % -- --   09/27/23 0937 124/85 98.6  F (37  C) Oral 96 20 100 % 1.6 m (5' 3\") 64.9 kg (143 lb)     General: No distress  Cardiac: Regular rhythm, no murmur  Lungs: Clear to auscultation bilaterally  Musculoskeletal: Mild left cervical paraspinal tenderness.  Mild left upper chest wall tenderness without crepitance.  Neuro: Strength 5 out of 5.  Diminished sensation dorsum of left hand compared to right.    Appropriate interventions for symptom management were initiated if applicable.  Appropriate diagnostic tests were initiated if indicated.    I briefly evaluated the patient and developed an initial plan of care. I discussed this plan and explained that this brief interaction does not constitute a full evaluation. Patient/family understands that they should wait to be fully evaluated and discuss any test results with another clinician prior to leaving the hospital.    Scribe Disclosure:  I, Arelisnaimakaye Eva, am serving as a scribe at 1:18 PM on 9/27/2023 to document services personally performed by John Love MD based on my observations and the provider's statements to me.     John Love MD  09/27/23 " [id number]

## 2023-09-27 NOTE — TELEPHONE ENCOUNTER
Called the pt to discuss.    She has had left arm pain for 2 weeks.  She was waiting for it to go away.  Now when she lays down, she feels pain on her upper left shoulder.  About 3 days ago, she can feel pressure on her left chest.  She has the left chest pain and arm pain right now.  She is rating the pain 5/10.  She knows it is there.  It does not severely hurt.  If she is doing daily activity, her breathing is more shallow, she has to try to breath a little deeper.  She has this only when she is busy doing activity.  She is not SOB working at her desk.  She has a little bit of nausea.      She tried to be seen in UC last week.  They told her to be seen in ER.  She said she went there and left because it would take too long and she has too many other things to do.  She does not want to go back to the ER.  Advised I think that is what Shayla will recommend, but I am happy to ask.      Spoke to Shayla Palacios.  She advised she should be seen in the ER with the symptoms she is having.  Pt was advised of this.

## 2023-09-27 NOTE — ADDENDUM NOTE
Addended by: ASCENCION BUTLER on: 9/27/2023 09:21 AM     Modules accepted: Orders, Level of Service

## 2023-09-28 NOTE — DISCHARGE INSTRUCTIONS
Pain on her arm likely represents a cervical radiculopathy, in other words an issue with the nerves that leave your neck and innervate your arm and the lateral aspect of your neck.  You have evidence for a small disc bulge, perhaps this is causing some of your symptoms.  We will trial a course of steroids to see if this helps.  You should follow-up with a spine doctor with persistent symptoms.  With respect to the chest discomfort, there is no evidence for life-threatening cause based upon your work-up here.

## 2023-12-07 ENCOUNTER — VIRTUAL VISIT (OUTPATIENT)
Dept: OBGYN | Facility: CLINIC | Age: 33
End: 2023-12-07
Payer: COMMERCIAL

## 2023-12-07 DIAGNOSIS — Z34.90 SUPERVISION OF NORMAL PREGNANCY: Primary | ICD-10-CM

## 2023-12-07 LAB
ANION GAP SERPL CALCULATED.3IONS-SCNC: 11 MMOL/L (ref 7–15)
BASOPHILS # BLD AUTO: 0.1 10E3/UL (ref 0–0.2)
BASOPHILS NFR BLD AUTO: 1 %
BUN SERPL-MCNC: 11 MG/DL (ref 6–20)
CALCIUM SERPL-MCNC: 8.6 MG/DL (ref 8.6–10)
CHLORIDE SERPL-SCNC: 103 MMOL/L (ref 98–107)
CREAT SERPL-MCNC: 0.59 MG/DL (ref 0.51–0.95)
DEPRECATED HCO3 PLAS-SCNC: 22 MMOL/L (ref 22–29)
EGFRCR SERPLBLD CKD-EPI 2021: >90 ML/MIN/1.73M2
EOSINOPHIL # BLD AUTO: 0 10E3/UL (ref 0–0.7)
EOSINOPHIL NFR BLD AUTO: 0 %
ERYTHROCYTE [DISTWIDTH] IN BLOOD BY AUTOMATED COUNT: 13.2 % (ref 10–15)
GLUCOSE SERPL-MCNC: 93 MG/DL (ref 70–99)
HCT VFR BLD AUTO: 38.6 % (ref 35–47)
HGB BLD-MCNC: 12.7 G/DL (ref 11.7–15.7)
IMM GRANULOCYTES # BLD: 0 10E3/UL
IMM GRANULOCYTES NFR BLD: 0 %
LYMPHOCYTES # BLD AUTO: 3.1 10E3/UL (ref 0.8–5.3)
LYMPHOCYTES NFR BLD AUTO: 30 %
MCH RBC QN AUTO: 28.2 PG (ref 26.5–33)
MCHC RBC AUTO-ENTMCNC: 32.9 G/DL (ref 31.5–36.5)
MCV RBC AUTO: 86 FL (ref 78–100)
MONOCYTES # BLD AUTO: 0.7 10E3/UL (ref 0–1.3)
MONOCYTES NFR BLD AUTO: 7 %
NEUTROPHILS # BLD AUTO: 6.4 10E3/UL (ref 1.6–8.3)
NEUTROPHILS NFR BLD AUTO: 62 %
NRBC # BLD AUTO: 0 10E3/UL
NRBC BLD AUTO-RTO: 0 /100
PLATELET # BLD AUTO: 272 10E3/UL (ref 150–450)
POTASSIUM SERPL-SCNC: 4 MMOL/L (ref 3.4–5.3)
RBC # BLD AUTO: 4.51 10E6/UL (ref 3.8–5.2)
SODIUM SERPL-SCNC: 136 MMOL/L (ref 135–145)
WBC # BLD AUTO: 10.3 10E3/UL (ref 4–11)

## 2023-12-07 PROCEDURE — 99207 PR NO CHARGE NURSE ONLY: CPT

## 2023-12-07 PROCEDURE — 36415 COLL VENOUS BLD VENIPUNCTURE: CPT | Performed by: EMERGENCY MEDICINE

## 2023-12-07 PROCEDURE — 84702 CHORIONIC GONADOTROPIN TEST: CPT | Performed by: EMERGENCY MEDICINE

## 2023-12-07 PROCEDURE — 99284 EMERGENCY DEPT VISIT MOD MDM: CPT | Mod: 25

## 2023-12-07 PROCEDURE — 85025 COMPLETE CBC W/AUTO DIFF WBC: CPT | Performed by: EMERGENCY MEDICINE

## 2023-12-07 PROCEDURE — 80048 BASIC METABOLIC PNL TOTAL CA: CPT | Performed by: EMERGENCY MEDICINE

## 2023-12-07 RX ORDER — AMOXICILLIN 500 MG
1200 CAPSULE ORAL DAILY
COMMUNITY

## 2023-12-07 RX ORDER — FOLIC ACID 1 MG/1
1 TABLET ORAL DAILY
COMMUNITY

## 2023-12-07 NOTE — NURSING NOTE
NPN nurse visit done over the phone. Pt will be given NPN folder and book at her upcoming appt.   Discussed optional screening available to assess chromosomal anomalies. Questions answered. Pt advised to call the clinic if she has any questions or concerns related to her pregnancy. Prenatal labs will be obtained at her upcoming appt. New prenatal visit scheduled on 1/3/24 with Dr. Phan.    Pt did have NPN labs done at [facility]. They are in care everywhere.    6w5d    Last pap: 7/2023        Patient supplied answers from flow sheet for:  Prenatal OB Questionnaire.  Past Medical History  Have you ever recieved care for your mental health? : No  Have you ever been in a major accident or suffered serious trauma?: No  Within the last year, has anyone hit, slapped, kicked or otherwise hurt you?: No  In the last year, has anyone forced you to have sex when you didn't want to?: No    Past Medical History 2   Have you ever received a blood transfusion?: No  Would you accept a blood transfusion if was medically recommended?: Yes  Does anyone in your home smoke?: No   Is your blood type Rh negative?: No  Have you ever ?: No  Have you been hospitalized for a nonsurgical reason excluding normal delivery?: No  Have you ever had an abnormal pap smear?: No    Past Medical History (Continued)  Do you have a history of abnormalities of the uterus?: No  Did your mother take KIMBERLY or any other hormones when she was pregnant with you?: No  Do you have any other problems we have not asked about which you feel may be important to this pregnancy?: No         Susannah CEDILLO RN

## 2023-12-08 ENCOUNTER — HOSPITAL ENCOUNTER (EMERGENCY)
Facility: CLINIC | Age: 33
Discharge: HOME | End: 2023-12-08
Attending: EMERGENCY MEDICINE | Admitting: EMERGENCY MEDICINE
Payer: COMMERCIAL

## 2023-12-08 ENCOUNTER — TELEPHONE (OUTPATIENT)
Dept: OBGYN | Facility: CLINIC | Age: 33
End: 2023-12-08

## 2023-12-08 ENCOUNTER — APPOINTMENT (OUTPATIENT)
Dept: ULTRASOUND IMAGING | Facility: CLINIC | Age: 33
End: 2023-12-08
Attending: EMERGENCY MEDICINE
Payer: COMMERCIAL

## 2023-12-08 VITALS
HEART RATE: 86 BPM | TEMPERATURE: 98 F | RESPIRATION RATE: 16 BRPM | DIASTOLIC BLOOD PRESSURE: 84 MMHG | SYSTOLIC BLOOD PRESSURE: 121 MMHG | OXYGEN SATURATION: 100 %

## 2023-12-08 DIAGNOSIS — O20.9 VAGINAL BLEEDING AFFECTING EARLY PREGNANCY: ICD-10-CM

## 2023-12-08 LAB
ABO/RH(D): NORMAL
ALBUMIN UR-MCNC: NEGATIVE MG/DL
ANTIBODY SCREEN: NEGATIVE
APPEARANCE UR: CLEAR
BILIRUB UR QL STRIP: NEGATIVE
COLOR UR AUTO: ABNORMAL
GLUCOSE UR STRIP-MCNC: NEGATIVE MG/DL
HCG INTACT+B SERPL-ACNC: 7120 MIU/ML
HCG UR QL: POSITIVE
HGB UR QL STRIP: ABNORMAL
KETONES UR STRIP-MCNC: NEGATIVE MG/DL
LEUKOCYTE ESTERASE UR QL STRIP: NEGATIVE
NITRATE UR QL: NEGATIVE
PH UR STRIP: 5.5 [PH] (ref 5–7)
RBC URINE: 1 /HPF
SP GR UR STRIP: 1 (ref 1–1.03)
SPECIMEN EXPIRATION DATE: NORMAL
SQUAMOUS EPITHELIAL: 2 /HPF
UROBILINOGEN UR STRIP-MCNC: NORMAL MG/DL
WBC URINE: 1 /HPF

## 2023-12-08 PROCEDURE — 86850 RBC ANTIBODY SCREEN: CPT | Performed by: EMERGENCY MEDICINE

## 2023-12-08 PROCEDURE — 81025 URINE PREGNANCY TEST: CPT | Performed by: EMERGENCY MEDICINE

## 2023-12-08 PROCEDURE — 76801 OB US < 14 WKS SINGLE FETUS: CPT

## 2023-12-08 PROCEDURE — 36415 COLL VENOUS BLD VENIPUNCTURE: CPT | Performed by: EMERGENCY MEDICINE

## 2023-12-08 PROCEDURE — 81003 URINALYSIS AUTO W/O SCOPE: CPT | Performed by: EMERGENCY MEDICINE

## 2023-12-08 PROCEDURE — 86901 BLOOD TYPING SEROLOGIC RH(D): CPT | Performed by: EMERGENCY MEDICINE

## 2023-12-08 ASSESSMENT — ACTIVITIES OF DAILY LIVING (ADL)
ADLS_ACUITY_SCORE: 35
ADLS_ACUITY_SCORE: 37

## 2023-12-08 NOTE — ED PROVIDER NOTES
History     Chief Complaint:  Vaginal Bleeding - Pregnant       HPI   Escobar Baron is a 32 year old female, 7 weeks pregnant, who presents to the ED for vaginal bleeding. Patient reports she started having spotting on 12/6 early during the day. States it was a light brownish color. Adds she called the [facility] and was recommend to come to the ED if there was continuous bleeding. Last night at around 8 pm, patient noticed more spotting, this time it was bright red as well as some tissue and cramps. Patient reports having some mid light headedness. Adds this is her third pregnancy. Has two children, born normal and no problems with birth. Denies feeling dizzy or fainting. She adds the bleeding stopping during the night. Patient has an OB apportionment ultrasound scheduled on 12/27.     Independent Historian:   Patient     Review of External Notes:   Nurse midwife note from 12/6/2023 reviewed when the patient called for spotting and low back pain.        Medications:    Fluticasone   Omeprazole     Past Medical History:    Migraine   Right lumbar radiculopathy   Vitamin D deficiency   Varicella     Past Surgical History:    [place] tooth extraction      Physical Exam   Patient Vitals for the past 24 hrs:   BP Temp Temp src Pulse Resp SpO2   12/08/23 0215 -- -- -- -- -- 100 %   12/08/23 0200 121/84 -- -- -- -- 100 %   12/08/23 0158 121/84 98  F (36.7  C) Oral 86 16 100 %   12/07/23 2154 124/89 97.7  F (36.5  C) Temporal 73 18 100 %        Physical Exam  General: Sitting on the ED chair  HEENT: Normocephalic, atraumatic  Cardiac: Warm and well perfused  Pulm: Breathing comfortably, no accessory muscle usage, no conversational dyspnea  GI: Abdomen soft, nontender, no rigidity or guarding  MSK: No bony deformities  Skin: Warm and dry  Neuro: Moves all extremities  Psych: Pleasant mood and affect    Emergency Department Course       Imaging:  US OB <14 Weeks W Transvaginal   Final Result   IMPRESSION:     1.  Intrauterine gestational sac with fetal pole measuring 6 weeks 0 days. No fetal heart tones detected which could be due to borderline age. Serial beta hCG and short-term ultrasound follow-up necessary to assess for fetal heart tones/viable pregnancy    and exclude spontaneous .   2.  Small amount of subchorionic hemorrhage.                 Laboratory:  Labs Ordered and Resulted from Time of ED Arrival to Time of ED Departure   ROUTINE UA WITH MICROSCOPIC REFLEX TO CULTURE - Abnormal       Result Value    Color Urine Straw      Appearance Urine Clear      Glucose Urine Negative      Bilirubin Urine Negative      Ketones Urine Negative      Specific Gravity Urine 1.004      Blood Urine Moderate (*)     pH Urine 5.5      Protein Albumin Urine Negative      Urobilinogen Urine Normal      Nitrite Urine Negative      Leukocyte Esterase Urine Negative      RBC Urine 1      WBC Urine 1      Squamous Epithelials Urine 2 (*)    HCG QUALITATIVE URINE - Abnormal    hCG Urine Qualitative Positive (*)    HCG QUANTITATIVE PREGNANCY - Abnormal    hCG Quantitative 7,120 (*)    BASIC METABOLIC PANEL - Normal    Sodium 136      Potassium 4.0      Chloride 103      Carbon Dioxide (CO2) 22      Anion Gap 11      Urea Nitrogen 11.0      Creatinine 0.59      GFR Estimate >90      Calcium 8.6      Glucose 93     CBC WITH PLATELETS AND DIFFERENTIAL    WBC Count 10.3      RBC Count 4.51      Hemoglobin 12.7      Hematocrit 38.6      MCV 86      MCH 28.2      MCHC 32.9      RDW 13.2      Platelet Count 272      % Neutrophils 62      % Lymphocytes 30      % Monocytes 7      % Eosinophils 0      % Basophils 1      % Immature Granulocytes 0      NRBCs per 100 WBC 0      Absolute Neutrophils 6.4      Absolute Lymphocytes 3.1      Absolute Monocytes 0.7      Absolute Eosinophils 0.0      Absolute Basophils 0.1      Absolute Immature Granulocytes 0.0      Absolute NRBCs 0.0     TYPE AND SCREEN, ADULT    ABO/RH(D) B POS      Antibody  Screen Negative      SPECIMEN EXPIRATION DATE [id number]     ABO/RH TYPE AND SCREEN        Procedures   none    Emergency Department Course & Assessments:      Interventions:  Medications - No data to display     Assessments:  0105 I obtained history and examined the patient as noted above.   I reevaluated the patient    Independent Interpretation (X-rays, CTs, rhythm strip):  None    Consultations/Discussion of Management or Tests:  I discussed the patient with Dr. King, OB/GYN       Social Determinants of Health affecting care:   None    Disposition:  The patient was discharged to home.     Impression & Plan    CMS Diagnoses: None      Medical Decision Makin-year-old pregnant female presents with vaginal bleeding and cramping.  Also states that she thinks she passed some tissue tonight.  Presentation is concerning for a miscarriage.  Transvaginal ultrasound obtained here and shows possible spontaneous miscarriage, though too early on in the pregnancy to tell definitively.  No signs of acute hemorrhage given the patient's vital signs, mild spotting and history, stable H&H a day after onset of symptoms.  Patient is Rh positive, no indication for RhoGAM.  Quantitative hCG obtained.  I discussed the patient with OB/GYN and they recommend 48-hour quantitative hCG and follow-up in their clinic this coming week for reevaluation and repeat ultrasound.  I discussed the above with the patient as well as the possibility of miscarriage and she and her partner expressed understanding.  Plan is for discharge home with repeat lab draw and OB/GYN follow-up as described.     Diagnosis:    ICD-10-CM    1. Vaginal bleeding affecting early pregnancy  O20.9 hCG Quantitative Pregnancy              Scribe Disclosure:  Ashleigh UP, am serving as a scribe at 1:02 AM on 2023 to document services personally performed by Ronan Casey MD based on my observations and the provider's statements to me.     2023    Ronan Casey MD King, Colin, MD  12/08/23 0625

## 2023-12-08 NOTE — TELEPHONE ENCOUNTER
Patient calling -    Was seen in the ED last night with vaginal bleeding.  Ultrasound and blood work was indeterminate of a early gestation or impending SAB.  Patient has repeat HCG ordered by ED doctor and will be doing the walk in lab at [facility] on Sunday morning to trend HCG.  Patient was instructed by ED provider to follow up with OB/GYN on Monday to review results.    Scheduled with CNM, bleeding/miscarriage precautions reviewed with patient.    Brenda LEI RN

## 2023-12-08 NOTE — ED TRIAGE NOTES
Pt arrives with complaint of vaginal bleeding, is approximately 7 weeks pregnant, third pregnancy, carried other to full term without complication. Spotting was dark brown and began yesterday afternoon, became slightly more heavy and redder throughout today. Just replaced her first pad PTA. Some abdominal cramping and low back pain began earlier today. A&Ox4.

## 2023-12-11 ENCOUNTER — PRENATAL OFFICE VISIT (OUTPATIENT)
Dept: MIDWIFE SERVICES | Facility: CLINIC | Age: 33
End: 2023-12-11
Payer: COMMERCIAL

## 2023-12-11 VITALS
WEIGHT: 138 LBS | BODY MASS INDEX: 24.45 KG/M2 | HEIGHT: 63 IN | DIASTOLIC BLOOD PRESSURE: 62 MMHG | SYSTOLIC BLOOD PRESSURE: 106 MMHG

## 2023-12-11 DIAGNOSIS — O20.9 BLEEDING IN EARLY PREGNANCY: ICD-10-CM

## 2023-12-11 DIAGNOSIS — O36.80X0 PREGNANCY WITH UNCERTAIN FETAL VIABILITY, SINGLE OR UNSPECIFIED FETUS: Primary | ICD-10-CM

## 2023-12-11 PROCEDURE — 84702 CHORIONIC GONADOTROPIN TEST: CPT | Performed by: ADVANCED PRACTICE MIDWIFE

## 2023-12-11 PROCEDURE — 99203 OFFICE O/P NEW LOW 30 MIN: CPT | Performed by: ADVANCED PRACTICE MIDWIFE

## 2023-12-11 PROCEDURE — 36415 COLL VENOUS BLD VENIPUNCTURE: CPT | Performed by: ADVANCED PRACTICE MIDWIFE

## 2023-12-11 NOTE — PROGRESS NOTES
SUBJECTIVE:                                                   Escobar Baron is a 32 year old who presents to clinic today for the following health issue(s):  Patient presents with:  Follow Up: ED follow up for spotting in early pregnancy      HPI:  ***    Patient's last menstrual period was 10/21/2023.  Menstrual History: amenorrhea- pregnant  Patient is sexually active  .  Using none for contraception.   Health maintenance updated:  no  STI infx testing offered:  Declined    PHQ-2 Score:         2023     3:41 PM 3/14/2023     4:08 PM   PHQ-2 (  [employer])   Q1: Little interest or pleasure in doing things 0 0   Q2: Feeling down, depressed or hopeless 0 0   PHQ-2 Score 0 0   Q1: Little interest or pleasure in doing things  Not at all   Q2: Feeling down, depressed or hopeless  Not at all   PHQ-2 Score  0       Problem list and histories reviewed & adjusted, as indicated.  Additional history: as documented.    Patient Active Problem List   Diagnosis    Migraine headache    Carrier of group B Streptococcus    Family history of malignant neoplasm of ovary     Past Surgical History:   Procedure Laterality Date    ENT SURGERY  2010    wisdom tooth extraction      Social History     Tobacco Use    Smoking status: Never    Smokeless tobacco: Never   Substance Use Topics    Alcohol use: No      Problem (# of Occurrences) Relation (Name,Age of Onset)    Cancer (1) Sister:  age 4 kidney cancer    Hypertension (2) Mother, Father    Ovarian Cancer (3) Maternal Aunt, Maternal Aunt, Maternal Aunt    Diabetes Type 2  (1) Father    No Known Problems (1) Sister           Negative family history of: Breast Cancer, Colon Cancer              Current Outpatient Medications   Medication Sig    folic acid (FOLVITE) 1 MG tablet Take 1 mg by mouth daily    Iron-Folic Acid-Vit B12 (IRON FORMULA PO)     Omega-3 Fatty Acids (FISH OIL) 1200 MG capsule Take 1,200 mg by mouth daily    omeprazole (PRILOSEC) 20 MG   "capsule Take 1 capsule (20 mg) by mouth daily (Patient not taking: Reported on 12/7/2023)     No current facility-administered medications for this visit.     No Known Allergies    ROS:  {ROSFABBYN:421871::\"12 point review of systems negative other than symptoms noted below.\"}    OBJECTIVE:     /62   Ht 1.6 m (5' 3\")   Wt 62.6 kg (138 lb)   LMP 10/21/2023   BMI 24.45 kg/m    Body mass index is 24.45 kg/m .    PHYSICAL EXAM:  {[facility] EXAM CHOICES:279766}     In-Clinic Test Results:  No results found for this or any previous visit (from the past 24 hour(s)).    ASSESSMENT/PLAN:                                                      No diagnosis found.    COUNSELING:      {2021 E&M time (Optional):753546}    ***  "

## 2023-12-11 NOTE — PROGRESS NOTES
SUBJECTIVE:                                                   Escobar Baron is a 32 year old who presents to clinic today for the following health issue(s):  Patient presents with:  Follow Up: ED follow up for spotting in early pregnancy      HPI:  Escobar presents for ED follow up. She was seen  d/t spotting in early pregnancy. She had a TVUS which showed intrauterine gestational sac w/ fetal pole at 6w0d no fetal heart tones. There was also a small subchorionic hemorrhage. Her HCG was 7,120.     She states that since , she continues to have some spotting and low back pain. Spotting in mostly brown blood with small clots. She was going to get HCG redrawn over the weekend, but the lab wasn't open when she went.   LMP 10/21/23  making her 7w2d ega today    EXAM: US OB <14 WEEKS WITH TRANSVAGINAL SINGLE  LOCATION: [facility]  DATE: 2023     INDICATION: vaginal bleeding, 7 weeks OB  COMPARISON: None.  TECHNIQUE: Transabdominal scans were performed. Endovaginal ultrasound was performed to better visualize the embryo.     FINDINGS:  UTERUS: Intrauterine gestational sac.  CRL: Measures 0.3 cm, equals 6 weeks 0 days.  FETAL HEART RATE: Not detected  AMNIOTIC FLUID: Normal.  PLACENTA: Not yet formed. 0.6 x 2.3 x 0.1 cm subchorionic hemorrhage     RIGHT OVARY: 3 x 2.8 x 2.2 cm. Corpus luteal cyst.  LEFT OVARY: 1.8 x 1.8 x 1.1 cm.                                                                      IMPRESSION:   1.  Intrauterine gestational sac with fetal pole measuring 6 weeks 0 days. No fetal heart tones detected which could be due to borderline age. Serial beta hCG and short-term ultrasound follow-up necessary to assess for fetal heart tones/viable pregnancy   and exclude spontaneous .  2.  Small amount of subchorionic hemorrhage.      Patient's last menstrual period was 10/21/2023.    Patient's last menstrual period was 10/21/2023.  Menstrual History: amenorrhea-  "pregnant  Patient is sexually active  .  Using none for contraception.   Health maintenance updated:  no  STI infx testing offered:  Declined    Last PHQ-9 score on record =       2020     9:57 AM   PHQ-9 SCORE   PHQ-9 Total Score 3     Last GAD7 score on record =       2020     9:57 AM   PHILIP-7 SCORE   Total Score 0     Problem list and histories reviewed & adjusted, as indicated.  Additional history: as documented.    Patient Active Problem List   Diagnosis    Migraine headache    Carrier of group B Streptococcus    Family history of malignant neoplasm of ovary     Past Surgical History:   Procedure Laterality Date    ENT SURGERY  2010    wisdom tooth extraction      Social History     Tobacco Use    Smoking status: Never    Smokeless tobacco: Never   Substance Use Topics    Alcohol use: No      Problem (# of Occurrences) Relation (Name,Age of Onset)    Cancer (1) Sister:  age 4 kidney cancer    Hypertension (2) Mother, Father    Ovarian Cancer (3) Maternal Aunt, Maternal Aunt, Maternal Aunt    Diabetes Type 2  (1) Father    No Known Problems (1) Sister           Negative family history of: Breast Cancer, Colon Cancer              Current Outpatient Medications   Medication Sig    folic acid (FOLVITE) 1 MG tablet Take 1 mg by mouth daily    Iron-Folic Acid-Vit B12 (IRON FORMULA PO)     Omega-3 Fatty Acids (FISH OIL) 1200 MG capsule Take 1,200 mg by mouth daily    omeprazole (PRILOSEC) 20 MG DR capsule Take 1 capsule (20 mg) by mouth daily (Patient not taking: Reported on 2023)     No current facility-administered medications for this visit.     No Known Allergies    ROS:   ROS: 10 point ROS neg other than the symptoms noted above in the HPI.     OBJECTIVE:     /62   Ht 1.6 m (5' 3\")   Wt 62.6 kg (138 lb)   LMP 10/21/2023   BMI 24.45 kg/m    Body mass index is 24.45 kg/m .    PHYSICAL EXAM:  Constitutional:  Appearance: Well nourished, well developed alert, in no acute " distress  Skin: General Inspection:  No rashes present, no lesions present, no areas of discoloration.  Neurologic:  Mental Status:  Oriented X3.  Normal strength and tone, sensory exam grossly normal, mentation intact and speech normal.    Psychiatric:  Mentation appears normal and affect normal/bright.     In-Clinic Test Results:  No results found for this or any previous visit (from the past 24 hour(s)).    ASSESSMENT/PLAN:                                                        ICD-10-CM    1. Pregnancy with uncertain fetal viability, single or unspecified fetus  O36.80X0 HCG quantitative pregnancy     US Pelvic Complete with Transvaginal      2. Bleeding in early pregnancy  O20.9           PLAN:    - repeat HCG today and Wednesday  - Repeat TVUS on Friday with provider visit after to discuss plan   - Discussed miscarriage precautions and counseled to call with any concerns .    ALESSANDRO Fortune, CNM

## 2023-12-11 NOTE — NURSING NOTE
"Chief Complaint   Patient presents with    Follow Up     ED follow up for spotting in early pregnancy       Initial /62   Ht 1.6 m (5' 3\")   Wt 62.6 kg (138 lb)   LMP 10/21/2023   BMI 24.45 kg/m   Estimated body mass index is 24.45 kg/m  as calculated from the following:    Height as of this encounter: 1.6 m (5' 3\").    Weight as of this encounter: 62.6 kg (138 lb).  BP completed using cuff size: regular    Questioned patient about current smoking habits.  Pt. has never smoked.          Abdominal Cramping and back pain started this morning  Still spotting, red-brown  Ely Zuniga, NAMITA on 2023 at 3:37 PM    "

## 2023-12-12 LAB — HCG INTACT+B SERPL-ACNC: ABNORMAL MIU/ML

## 2023-12-13 ENCOUNTER — LAB (OUTPATIENT)
Dept: LAB | Facility: CLINIC | Age: 33
End: 2023-12-13
Payer: COMMERCIAL

## 2023-12-13 DIAGNOSIS — O36.80X0 PREGNANCY WITH UNCERTAIN FETAL VIABILITY, SINGLE OR UNSPECIFIED FETUS: ICD-10-CM

## 2023-12-13 DIAGNOSIS — O20.9 BLEEDING IN EARLY PREGNANCY: ICD-10-CM

## 2023-12-13 PROCEDURE — 84702 CHORIONIC GONADOTROPIN TEST: CPT

## 2023-12-13 PROCEDURE — 36415 COLL VENOUS BLD VENIPUNCTURE: CPT

## 2023-12-14 ENCOUNTER — TELEPHONE (OUTPATIENT)
Dept: OBGYN | Facility: CLINIC | Age: 33
End: 2023-12-14

## 2023-12-14 ENCOUNTER — APPOINTMENT (OUTPATIENT)
Dept: ULTRASOUND IMAGING | Facility: CLINIC | Age: 33
End: 2023-12-14
Attending: EMERGENCY MEDICINE
Payer: COMMERCIAL

## 2023-12-14 ENCOUNTER — HOSPITAL ENCOUNTER (EMERGENCY)
Facility: CLINIC | Age: 33
Discharge: HOME | End: 2023-12-14
Attending: EMERGENCY MEDICINE | Admitting: EMERGENCY MEDICINE
Payer: COMMERCIAL

## 2023-12-14 VITALS
RESPIRATION RATE: 20 BRPM | HEART RATE: 84 BPM | TEMPERATURE: 97.2 F | OXYGEN SATURATION: 100 % | DIASTOLIC BLOOD PRESSURE: 82 MMHG | WEIGHT: 141.76 LBS | SYSTOLIC BLOOD PRESSURE: 115 MMHG | BODY MASS INDEX: 25.11 KG/M2

## 2023-12-14 DIAGNOSIS — O20.0 THREATENED ABORTION: ICD-10-CM

## 2023-12-14 LAB
BASOPHILS # BLD AUTO: 0.1 10E3/UL (ref 0–0.2)
BASOPHILS NFR BLD AUTO: 1 %
EOSINOPHIL # BLD AUTO: 0 10E3/UL (ref 0–0.7)
EOSINOPHIL NFR BLD AUTO: 0 %
ERYTHROCYTE [DISTWIDTH] IN BLOOD BY AUTOMATED COUNT: 13 % (ref 10–15)
HCG INTACT+B SERPL-ACNC: ABNORMAL MIU/ML
HCG INTACT+B SERPL-ACNC: ABNORMAL MIU/ML
HCT VFR BLD AUTO: 37.9 % (ref 35–47)
HGB BLD-MCNC: 12.4 G/DL (ref 11.7–15.7)
HOLD SPECIMEN: NORMAL
HOLD SPECIMEN: NORMAL
IMM GRANULOCYTES # BLD: 0 10E3/UL
IMM GRANULOCYTES NFR BLD: 0 %
LYMPHOCYTES # BLD AUTO: 2.2 10E3/UL (ref 0.8–5.3)
LYMPHOCYTES NFR BLD AUTO: 23 %
MCH RBC QN AUTO: 28.2 PG (ref 26.5–33)
MCHC RBC AUTO-ENTMCNC: 32.7 G/DL (ref 31.5–36.5)
MCV RBC AUTO: 86 FL (ref 78–100)
MONOCYTES # BLD AUTO: 0.7 10E3/UL (ref 0–1.3)
MONOCYTES NFR BLD AUTO: 7 %
NEUTROPHILS # BLD AUTO: 6.7 10E3/UL (ref 1.6–8.3)
NEUTROPHILS NFR BLD AUTO: 69 %
NRBC # BLD AUTO: 0 10E3/UL
NRBC BLD AUTO-RTO: 0 /100
PLATELET # BLD AUTO: 248 10E3/UL (ref 150–450)
RBC # BLD AUTO: 4.4 10E6/UL (ref 3.8–5.2)
WBC # BLD AUTO: 9.8 10E3/UL (ref 4–11)

## 2023-12-14 PROCEDURE — 36415 COLL VENOUS BLD VENIPUNCTURE: CPT | Performed by: EMERGENCY MEDICINE

## 2023-12-14 PROCEDURE — 99284 EMERGENCY DEPT VISIT MOD MDM: CPT | Mod: 25

## 2023-12-14 PROCEDURE — 84702 CHORIONIC GONADOTROPIN TEST: CPT | Performed by: EMERGENCY MEDICINE

## 2023-12-14 PROCEDURE — 76801 OB US < 14 WKS SINGLE FETUS: CPT

## 2023-12-14 PROCEDURE — 85025 COMPLETE CBC W/AUTO DIFF WBC: CPT | Performed by: EMERGENCY MEDICINE

## 2023-12-14 ASSESSMENT — ACTIVITIES OF DAILY LIVING (ADL): ADLS_ACUITY_SCORE: 37

## 2023-12-14 NOTE — TELEPHONE ENCOUNTER
Pt calling.  7w5d    She is having an increase in bright red bleeding.   Last hcg quant was yesterday.    Denies cramping.     Pt is B pos.     She is scheduled for an US tomorrow.    States that she has been feeling a little dizzy all day.    She is on her way to the ED right now as she wants to know what is going on.     Routed to the midwife group as an FYI.    Susannah CEDILLO RN

## 2023-12-14 NOTE — ED TRIAGE NOTES
Pt was here Thursday for vaginal bleeding, is 7wks pregnant. Saw OB afterwards and HCG levels have been increasing. Today, walking around the mall and started having heavy bleeding, apprx 1.5 pads per hour. Slight dizziness, bright red blood with no clots, mid back pain but no cramping.    Last HCG drawn on Tuesday and was 22,971.

## 2023-12-14 NOTE — ED PROVIDER NOTES
History     Chief Complaint:  Vaginal Bleeding - Pregnant       HPI   Escobar Baron is a 33 year old female  at 7 weeks 6 days with a past medical history of vitamin D deficiency who presents with vaginal bleeding. The patient was seen for her third pregnancy a week ago and on US did not see a heart beat. Today the patient has been having worse bleeding. She states that she was walking a lot this afternoon and suddenly felt a rush of bleeding. She went through one pad this morning and one after the sudden rush. Patient has been feeling some dizziness and has been having bleeding since . Patient is also having some upper abdominal pain. The patient called the nurse line who recommended that she comes into the ED.      Independent Historian:   None - Patient Only    Review of External Notes:   Viewed midwife note from Roxi gentile on 2023 in which they reviewed her most recent ultrasound revealed an intrauterine gestational sac with fetal pole measuring 6 weeks 0 days but no fetal heart tones detected      Medications:    folic acid   omeprazole     Past Medical History:    Migraines  Varicella  Vitamin D deficiency    Past Surgical History:    [place] teeth extraction     Physical Exam   Patient Vitals for the past 24 hrs:   BP Temp Temp src Pulse Resp SpO2 Weight   23 1632 115/82 97.2  F (36.2  C) Temporal 84 20 100 % 64.3 kg (141 lb 12.1 oz)        Physical Exam  HEENT:    Oropharynx is moist  Eyes:    Conjunctiva normal  Neck:     Supple, no meningismus.     CV:     Regular rate and rhythm.      No murmurs, rubs or gallops.     No lower extremity edema.  PULM:    Clear to auscultation bilateral.       No respiratory distress.      Good air exchange.     No rales or wheezing.     No stridor.  ABD:    Soft, non-distended.       Minimal tenderness in the periumbilical area.     Bowel sounds normal.     No pulsatile masses.       No rebound, guarding or rigidity.     No CVA tenderness.   MSK:      No gross deformity to all four extremities.   LYMPH:   No cervical lymphadenopathy.  NEURO:   Alert.  Good muscular tone, no atrophy.   Skin:    Warm, dry and intact.    Psych:    Mood is good and affect is appropriate.      Emergency Department Course     Imaging:  OB  US 1st trimester w transvag   Final Result   IMPRESSION:    1.  Single living intrauterine gestation at 6 weeks 5 days, EDC 2024.              Laboratory:  Labs Ordered and Resulted from Time of ED Arrival to Time of ED Departure   HCG QUANTITATIVE PREGNANCY - Abnormal       Result Value    hCG Quantitative 26,757 (*)    CBC WITH PLATELETS AND DIFFERENTIAL    WBC Count 9.8      RBC Count 4.40      Hemoglobin 12.4      Hematocrit 37.9      MCV 86      MCH 28.2      MCHC 32.7      RDW 13.0      Platelet Count 248      % Neutrophils 69      % Lymphocytes 23      % Monocytes 7      % Eosinophils 0      % Basophils 1      % Immature Granulocytes 0      NRBCs per 100 WBC 0      Absolute Neutrophils 6.7      Absolute Lymphocytes 2.2      Absolute Monocytes 0.7      Absolute Eosinophils 0.0      Absolute Basophils 0.1      Absolute Immature Granulocytes 0.0      Absolute NRBCs 0.0        Emergency Department Course & Assessments:    Assessments:   Obtained the patients history and performed initial exam   Rechecked the patient and updated her on findings    Independent Interpretation (X-rays, CTs, rhythm strip):  None    Social Determinants of Health affecting care:   None    Disposition:  The patient was discharged to home.     Impression & Plan      Medical Decision Makin-year-old female G3, P2 at 7 weeks 6 days based on LMP presents with recurrent vaginal bleeding.  She is not a RhoGAM candidate.  Hemoglobin is stable.  No significant recurrent bleeding during the ED.  hCG continues to rise.  Repeat ultrasound reveals progression of intrauterine pregnancy when compared to ultrasound done taken 7 days prior.  There is now a fetal  heart rate at 122.  Evaluation consistent with threatened .  Bleeding may be related to associated subchorionic hemorrhage.  Patient safe to discharge home with pelvic rest and close follow-up with OB/GYN physician.      Diagnosis:    ICD-10-CM    1. Threatened   O20.0            Scribe Disclosure:  I, Orlando Ruffinumberto, am serving as a scribe at 5:13 PM on 2023 to document services personally performed by Ji Cortes MD based on my observations and the provider's statements to me.     2023   Ji Cortes MD Matthews, Jeremiah R, MD  23

## 2023-12-15 ENCOUNTER — PRENATAL OFFICE VISIT (OUTPATIENT)
Dept: OBGYN | Facility: CLINIC | Age: 33
End: 2023-12-15
Payer: COMMERCIAL

## 2023-12-15 VITALS — BODY MASS INDEX: 24.98 KG/M2 | DIASTOLIC BLOOD PRESSURE: 80 MMHG | SYSTOLIC BLOOD PRESSURE: 115 MMHG | WEIGHT: 141 LBS

## 2023-12-15 DIAGNOSIS — O41.8X10 SUBCHORIONIC HEMATOMA IN FIRST TRIMESTER, SINGLE OR UNSPECIFIED FETUS: Primary | ICD-10-CM

## 2023-12-15 DIAGNOSIS — O20.9 FIRST TRIMESTER BLEEDING: ICD-10-CM

## 2023-12-15 DIAGNOSIS — O46.8X1 SUBCHORIONIC HEMATOMA IN FIRST TRIMESTER, SINGLE OR UNSPECIFIED FETUS: Primary | ICD-10-CM

## 2023-12-15 PROCEDURE — 99203 OFFICE O/P NEW LOW 30 MIN: CPT | Performed by: OBSTETRICS & GYNECOLOGY

## 2023-12-15 NOTE — PROGRESS NOTES
Assessment & Plan     Subchorionic hematoma in first trimester, single or unspecified fetus  - Despite her increased VB yesterday, the size of the EDU is smaller on yesterday's scan compared to 12/8/2023 U/S  - I advised Pt that although the VB was quite anxiety-provoking, it is reassuring that FHTs are normal, baby has grown commensurate to the 6 days between U/S's, and the EDU is smaller.  - Pt to observe pelvic rest, limit work to desk job only.  - Has follow-up U/S for her regular NOB scan 12/27/2023 and then NOB visit w/ Dr. King a week later.    First trimester bleeding  - Plan as above    Review of the result(s) of each unique test - 12/8/2023 OB U/S and 12/14/2023 OB U/S             No follow-ups on file.    Bran Farias MD  [facility] MIKE Cody is a 33 year old, presenting for the following health issues:  Prenatal Care (Follow up ED )      Pt here for ER follow-up from yesterday.  She has Hx 1st trim VB and had prior U/S 12/8/2023 which showed a viable IUP at 6w0d by CRL but a 0.6 x 2.3 x 0.1 cm subchorionic hemorrhage.  She was doing okay until yesterday when she had bright red VB that was a significant amount.  At ER again yest, an U/S showed a viable 6w5d by CRL fetus w/ normal FHR, and a 0.9 x 0.6 x 0.4 cm hypoechoic region adjacent to the gestational sac representing a small subchorionic hematoma. She was deemed stable and d/c'd home to follow-up today.  She states her VB is minimal now and although she had cramps before they are improved also.                   Review of Systems         Objective    /80 (BP Location: Right arm, Patient Position: Sitting, Cuff Size: Adult Regular)   Wt 64 kg (141 lb)   LMP 10/21/2023   BMI 24.98 kg/m    Body mass index is 24.98 kg/m .  Physical Exam  Constitutional:       General: She is not in acute distress.     Appearance: Normal appearance. She is normal weight. She is ill-appearing.   Neurological:       Mental Status: She is alert.            U/S 2023:  EXAM: US OB <14 WEEKS WITH TRANSVAGINAL SINGLE  LOCATION: [facility]  DATE: 2023     INDICATION: vaginal bleeding, 7 weeks OB  COMPARISON: None.  TECHNIQUE: Transabdominal scans were performed. Endovaginal ultrasound was performed to better visualize the embryo.     FINDINGS:  UTERUS: Intrauterine gestational sac.  CRL: Measures 0.3 cm, equals 6 weeks 0 days.  FETAL HEART RATE: Not detected  AMNIOTIC FLUID: Normal.  PLACENTA: Not yet formed. 0.6 x 2.3 x 0.1 cm subchorionic hemorrhage     RIGHT OVARY: 3 x 2.8 x 2.2 cm. Corpus luteal cyst.  LEFT OVARY: 1.8 x 1.8 x 1.1 cm.                                                                      IMPRESSION:   1.  Intrauterine gestational sac with fetal pole measuring 6 weeks 0 days. No fetal heart tones detected which could be due to borderline age. Serial beta hCG and short-term ultrasound follow-up necessary to assess for fetal heart tones/viable pregnancy   and exclude spontaneous .  2.  Small amount of subchorionic hemorrhage.      U/S 2023:  Results for orders placed or performed during the hospital encounter of 23   OB  US 1st trimester w transvag    Narrative    EXAM: US OB <14 WEEKS WITH TRANSVAGINAL SINGLE  LOCATION: [facility]  DATE: 2023    INDICATION: Vaginal bleeding. Versed administered pregnancy.  COMPARISON: 2023  TECHNIQUE: Transabdominal scans were performed. Endovaginal ultrasound was performed to better visualize the embryo.    FINDINGS:  UTERUS: Single normal appearing intrauterine gestation sac.  CRL: Measures 0.7 cm, equals 6 weeks 5 days.  FETAL HEART RATE: 122 bpm.  AMNIOTIC FLUID: Normal.  PLACENTA: 0.9 x 0.6 x 0.4 cm hypoechoic region adjacent to the gestational sac representing a small subchorionic hematoma.    RIGHT OVARY: 2.5 x 2.4 x 1.9 cm. Within normal limits.  LEFT OVARY: 2.3 x 1.8 x 1.1 cm. Within  normal limits.      Impression    IMPRESSION:   1.  Single living intrauterine gestation at 6 weeks 5 days, EDC 08/03/2024.

## 2023-12-18 ENCOUNTER — PRENATAL OFFICE VISIT (OUTPATIENT)
Dept: OBGYN | Facility: CLINIC | Age: 33
End: 2023-12-18
Payer: COMMERCIAL

## 2023-12-18 ENCOUNTER — HOSPITAL ENCOUNTER (OUTPATIENT)
Dept: ULTRASOUND IMAGING | Facility: CLINIC | Age: 33
Discharge: HOME | End: 2023-12-18
Attending: ADVANCED PRACTICE MIDWIFE | Admitting: ADVANCED PRACTICE MIDWIFE
Payer: COMMERCIAL

## 2023-12-18 VITALS
WEIGHT: 143.2 LBS | BODY MASS INDEX: 25.37 KG/M2 | SYSTOLIC BLOOD PRESSURE: 98 MMHG | HEIGHT: 63 IN | DIASTOLIC BLOOD PRESSURE: 60 MMHG

## 2023-12-18 DIAGNOSIS — O20.9 BLEEDING IN EARLY PREGNANCY: ICD-10-CM

## 2023-12-18 DIAGNOSIS — N93.9 VAGINAL BLEEDING: ICD-10-CM

## 2023-12-18 DIAGNOSIS — N92.6 MENSTRUAL CHANGES: ICD-10-CM

## 2023-12-18 DIAGNOSIS — O03.9 MISCARRIAGE: Primary | ICD-10-CM

## 2023-12-18 DIAGNOSIS — O20.9 VAGINAL BLEEDING AFFECTING EARLY PREGNANCY: ICD-10-CM

## 2023-12-18 LAB
HCG INTACT+B SERPL-ACNC: 2984 MIU/ML
HCG UR QL: POSITIVE
HGB BLD-MCNC: 13 G/DL (ref 11.7–15.7)

## 2023-12-18 PROCEDURE — 76801 OB US < 14 WKS SINGLE FETUS: CPT

## 2023-12-18 PROCEDURE — 85018 HEMOGLOBIN: CPT | Performed by: FAMILY MEDICINE

## 2023-12-18 PROCEDURE — 81025 URINE PREGNANCY TEST: CPT | Performed by: FAMILY MEDICINE

## 2023-12-18 PROCEDURE — 36415 COLL VENOUS BLD VENIPUNCTURE: CPT | Performed by: FAMILY MEDICINE

## 2023-12-18 PROCEDURE — 84702 CHORIONIC GONADOTROPIN TEST: CPT | Performed by: FAMILY MEDICINE

## 2023-12-18 PROCEDURE — 99214 OFFICE O/P EST MOD 30 MIN: CPT | Performed by: FAMILY MEDICINE

## 2023-12-18 RX ORDER — METHYLERGONOVINE MALEATE 0.2 MG/1
0.2 TABLET ORAL EVERY 6 HOURS
Qty: 8 TABLET | Refills: 0 | Status: SHIPPED | OUTPATIENT
Start: 2023-12-18 | End: 2023-12-20

## 2023-12-18 NOTE — NURSING NOTE
"Chief Complaint   Patient presents with    Results     Ultrasound today at 10am--having \"heavy\" bleeding--passed a \"clump of something\" on Friday--some cramping       Initial BP 98/60   Ht 1.6 m (5' 3\")   Wt 65 kg (143 lb 3.2 oz)   LMP 10/21/2023   BMI 25.37 kg/m   Estimated body mass index is 25.37 kg/m  as calculated from the following:    Height as of this encounter: 1.6 m (5' 3\").    Weight as of this encounter: 65 kg (143 lb 3.2 oz).  BP completed using cuff size: regular    Questioned patient about current smoking habits.  Pt. has never smoked.          The following HM Due: NONE    "

## 2023-12-18 NOTE — PATIENT INSTRUCTIONS
Return as needed       Dr. Elvie Dye, DO    Obstetrics and Gynecology  [facility] [place] [facility] [place]

## 2023-12-18 NOTE — PROGRESS NOTES
"SUBJECTIVE:  Escobar Baron is an 33 year old   woman who presents for   gynecology consult for miscarriage, completed on ultrasound, now with continued bleeding.    Some dizziness noted. Bleeding heavier but then lighter.     Patient's last menstrual period was 10/21/2023.       Past Medical History:   Diagnosis Date    Migraine headache     Migraines     Right lumbar radiculopathy 2015    Varicella     Vitamin D deficiency 2011    (Problem list name updated by automated process. Provider to review and confirm.)          Family History   Problem Relation Age of Onset    Hypertension Mother     Hypertension Father     Diabetes Type 2  Father     Cancer Sister          age 4 kidney cancer    No Known Problems Sister     Ovarian Cancer Maternal Aunt     Ovarian Cancer Maternal Aunt     Ovarian Cancer Maternal Aunt     Breast Cancer No family hx of     Colon Cancer No family hx of        Past Surgical History:   Procedure Laterality Date    ENT SURGERY  2010    wisdom tooth extraction       Current Outpatient Medications   Medication    folic acid (FOLVITE) 1 MG tablet    Iron-Folic Acid-Vit B12 (IRON FORMULA PO)    Omega-3 Fatty Acids (FISH OIL) 1200 MG capsule    omeprazole (PRILOSEC) 20 MG DR capsule     No current facility-administered medications for this visit.     No Known Allergies    Social History     Tobacco Use    Smoking status: Never    Smokeless tobacco: Never   Substance Use Topics    Alcohol use: No       Review Of Systems  Ears/Nose/Throat: negative  Respiratory: No shortness of breath, dyspnea on exertion, cough, or hemoptysis  Cardiovascular: negative  Gastrointestinal: negative  Genitourinary: See HPI   Constitutional, HEENT, cardiovascular, pulmonary, GI, , musculoskeletal, neuro, skin, endocrine and psych systems are negative, except as otherwise noted.    OBJECTIVE:  BP 98/60   Ht 1.6 m (5' 3\")   Wt 65 kg (143 lb 3.2 oz)   LMP 10/21/2023   BMI 25.37 kg/m  "   General appearance: healthy, alert, and no distress  Skin: Skin color, texture, turgor normal. No rashes or lesions.  Ears: negative  Nose/Sinuses: Nares normal. Septum midline. Mucosa normal. No drainage or sinus tenderness.  Oropharynx: Lips, mucosa, and tongue normal. Teeth and gums normal.  Neck: Neck supple. No adenopathy. Thyroid symmetric, normal size,, Carotids without bruits.  Lungs: negative, Percussion normal. Good diaphragmatic excursion. Lungs clear  Heart: negative, PMI normal. No lifts, heaves, or thrills. RRR. No murmurs, clicks gallops or rub  Pelvic: Pelvic:  Pelvic examination with no pap/no Gonorrhea and Chlamydia   including  External genitalia normal   and vagina normal rugatted not atrophic  Examination of urethra  normal no masses, tenderness, scarring  bladder, no masses or tenderness  Cervix no lesions or discharge scant bleeding         ASSESSMENT:  Escobar Baron is an 33 year old   woman who presents for   gynecology consult for miscarriage, completed on ultrasound, now with continued bleeding.    Some dizziness noted. Bleeding heavier but then lighter.    PLAN:  Dx:  1)  Miscarriage, vaginal bleeding:  no gestational sac or retained tissue noted on us today, passed tissue over the weekend,   Light bleeding and heavy at times.  Methergine prescribed to decrease the bleeding.   Wants to try again for pregnancy.  Ok to try after a months.      Labs were reviewed in [employer]   Imaging was reviewed in Epic   Tests and documents were reviewed.   Discussion of management or test interpretation   Diagnosis or treatment significantly limited by social determinant            Dr. Elvie Dye, DO    Obstetrics and Gynecology  [facility]

## 2024-09-08 ENCOUNTER — HEALTH MAINTENANCE LETTER (OUTPATIENT)
Age: 34
End: 2024-09-08

## 2024-09-11 ENCOUNTER — TELEPHONE (OUTPATIENT)
Dept: FAMILY MEDICINE | Facility: CLINIC | Age: 34
End: 2024-09-11
Payer: COMMERCIAL

## 2024-09-11 NOTE — TELEPHONE ENCOUNTER
Forms/Letter Request    Type of form/letter:   [facility]  Physicians Report    Do we have the form/letter: Yes:     Who is the form from? Patient    Where did/will the form come from? Patient or family brought in       When is form/letter needed by: ASAP    How would you like the form/letter returned:     Patient Notified form requests are processed in 5-7 business days:Yes    Could we send this information to you in [facility] or would you prefer to receive a phone call?:   Patient would prefer a phone call   Okay to leave a detailed message?: Yes at Cell number on file:    Telephone Information:   Mobile [phone]      Vee Radhamckenzie Patient Representative

## 2024-09-11 NOTE — TELEPHONE ENCOUNTER
Was not able to LVM message requesting a call back. Two more attempts will be made.     Pamela Del Rosario   [facility]

## 2024-09-12 NOTE — TELEPHONE ENCOUNTER
Was not able to LVMTCB in regards to their forms completion. Reached out to the Pt's spouse to see if we can get another number for the Pt but was not able to LVM.     LVM requesting a call back for an appt. One more attempt will be made.     Pamela Del Rosario   [facility]

## 2024-09-13 NOTE — TELEPHONE ENCOUNTER
Spoke with Pt in regards to her completed forms.     Pt says that she will pick the forms up today.     Pamela Del Rosario   [facility]

## 2024-10-03 NOTE — TELEPHONE ENCOUNTER
Patient did not  forms.  Will send in mail.    Made copy and faxed those to abstracting @ [phone].  Will hold copies at [facility].        Meenu MCNEIL, - [place] [address]  [facility]- [facility]

## 2024-10-29 ENCOUNTER — TELEPHONE (OUTPATIENT)
Dept: FAMILY MEDICINE | Facility: CLINIC | Age: 34
End: 2024-10-29
Payer: COMMERCIAL

## 2024-10-29 NOTE — TELEPHONE ENCOUNTER
Poss duplicate encounter. Placed in provider's basket for review.     Salima Shore  Lead   [facility] Del Rosario

## 2024-10-29 NOTE — TELEPHONE ENCOUNTER
Forms/Letter Request    Type of form/letter: OTHER:   [facility]  Physicians Report       Do we have the form/letter: Yes:     Who is the form from? Patient    Where did/will the form come from? Patient or family brought in       When is form/letter needed by: asap    How would you like the form/letter returned:     Patient Notified form requests are processed in 5-7 business days:Yes    Could we send this information to you in [employer] or would you prefer to receive a phone call?:   Patient would prefer a phone call   Okay to leave a detailed message?: Yes at Cell number on file:    Telephone Information:   Mobile [phone]    Vee Dillard Patient Representative

## 2024-10-30 NOTE — TELEPHONE ENCOUNTER
Pt was contacted and made aware that her form was completed and will be at the  ready for .     Pamela Del Rosario   [facility]

## 2024-10-30 NOTE — TELEPHONE ENCOUNTER
This form was completed 9/11/24; reprinted form. In out basket. Please let patient know.    Shayla Palacios PA-C

## 2024-11-08 ENCOUNTER — OFFICE VISIT (OUTPATIENT)
Dept: FAMILY MEDICINE | Facility: CLINIC | Age: 34
End: 2024-11-08
Payer: COMMERCIAL

## 2024-11-08 VITALS
SYSTOLIC BLOOD PRESSURE: 101 MMHG | DIASTOLIC BLOOD PRESSURE: 67 MMHG | TEMPERATURE: 98.3 F | HEIGHT: 63 IN | HEART RATE: 81 BPM | BODY MASS INDEX: 24.98 KG/M2 | WEIGHT: 141 LBS | RESPIRATION RATE: 11 BRPM | OXYGEN SATURATION: 100 %

## 2024-11-08 DIAGNOSIS — R20.2 NUMBNESS AND TINGLING IN BOTH HANDS: ICD-10-CM

## 2024-11-08 DIAGNOSIS — M54.2 NECK PAIN: ICD-10-CM

## 2024-11-08 DIAGNOSIS — R05.2 SUBACUTE COUGH: ICD-10-CM

## 2024-11-08 DIAGNOSIS — G43.E09 CHRONIC MIGRAINE WITH AURA WITHOUT STATUS MIGRAINOSUS, NOT INTRACTABLE: Primary | ICD-10-CM

## 2024-11-08 DIAGNOSIS — R20.0 NUMBNESS AND TINGLING IN BOTH HANDS: ICD-10-CM

## 2024-11-08 LAB
ANION GAP SERPL CALCULATED.3IONS-SCNC: 11 MMOL/L (ref 7–15)
BUN SERPL-MCNC: 15 MG/DL (ref 6–20)
CALCIUM SERPL-MCNC: 8.9 MG/DL (ref 8.8–10.4)
CHLORIDE SERPL-SCNC: 105 MMOL/L (ref 98–107)
CREAT SERPL-MCNC: 0.65 MG/DL (ref 0.51–0.95)
EGFRCR SERPLBLD CKD-EPI 2021: >90 ML/MIN/1.73M2
ERYTHROCYTE [DISTWIDTH] IN BLOOD BY AUTOMATED COUNT: 12.5 % (ref 10–15)
GLUCOSE SERPL-MCNC: 81 MG/DL (ref 70–99)
HCO3 SERPL-SCNC: 24 MMOL/L (ref 22–29)
HCT VFR BLD AUTO: 37 % (ref 35–47)
HGB BLD-MCNC: 12.4 G/DL (ref 11.7–15.7)
MCH RBC QN AUTO: 27.9 PG (ref 26.5–33)
MCHC RBC AUTO-ENTMCNC: 33.5 G/DL (ref 31.5–36.5)
MCV RBC AUTO: 83 FL (ref 78–100)
PLATELET # BLD AUTO: 258 10E3/UL (ref 150–450)
POTASSIUM SERPL-SCNC: 3.9 MMOL/L (ref 3.4–5.3)
RBC # BLD AUTO: 4.44 10E6/UL (ref 3.8–5.2)
SODIUM SERPL-SCNC: 140 MMOL/L (ref 135–145)
TSH SERPL DL<=0.005 MIU/L-ACNC: 1.32 UIU/ML (ref 0.3–4.2)
WBC # BLD AUTO: 7.5 10E3/UL (ref 4–11)

## 2024-11-08 PROCEDURE — 36415 COLL VENOUS BLD VENIPUNCTURE: CPT | Performed by: NURSE PRACTITIONER

## 2024-11-08 PROCEDURE — 80048 BASIC METABOLIC PNL TOTAL CA: CPT | Performed by: NURSE PRACTITIONER

## 2024-11-08 PROCEDURE — 85027 COMPLETE CBC AUTOMATED: CPT | Performed by: NURSE PRACTITIONER

## 2024-11-08 PROCEDURE — 99214 OFFICE O/P EST MOD 30 MIN: CPT | Performed by: NURSE PRACTITIONER

## 2024-11-08 PROCEDURE — 84443 ASSAY THYROID STIM HORMONE: CPT | Performed by: NURSE PRACTITIONER

## 2024-11-08 PROCEDURE — G2211 COMPLEX E/M VISIT ADD ON: HCPCS | Performed by: NURSE PRACTITIONER

## 2024-11-08 RX ORDER — AZITHROMYCIN 250 MG/1
TABLET, FILM COATED ORAL
Qty: 6 TABLET | Refills: 0 | Status: SHIPPED | OUTPATIENT
Start: 2024-11-08 | End: 2024-11-13

## 2024-11-08 RX ORDER — OMEPRAZOLE 20 MG/1
20 TABLET, DELAYED RELEASE ORAL DAILY
Qty: 30 TABLET | Refills: 0 | Status: SHIPPED | OUTPATIENT
Start: 2024-11-08

## 2024-11-08 RX ORDER — BENZONATATE 100 MG/1
200 CAPSULE ORAL 3 TIMES DAILY PRN
Qty: 35 CAPSULE | Refills: 0 | Status: SHIPPED | OUTPATIENT
Start: 2024-11-08

## 2024-11-08 ASSESSMENT — ENCOUNTER SYMPTOMS: COUGH: 1

## 2024-11-08 ASSESSMENT — PAIN SCALES - GENERAL: PAINLEVEL_OUTOF10: NO PAIN (0)

## 2024-11-08 NOTE — PROGRESS NOTES
Assessment & Plan     Chronic migraine with aura without status migrainosus, not intractable  It does sound like these are likely triggered by her neck pain.  She is going to regarding establish with her chiropractor.  Labs today.  If no improvement she will contact me as we will talk more about additional eval and treatment options.  Pt agrees with plan and verbalized understanding.  - CBC with platelets; Future  - Basic metabolic panel  (Ca, Cl, CO2, Creat, Gluc, K, Na, BUN); Future  - TSH with free T4 reflex; Future    Neck pain  Chronic.  She is going to see her chiropractor, which has been helpful in the past.    Numbness and tingling in both hands  Monitor.  Could be related to her neck symptoms.  Labs ordered.    Subacute cough  Discussed options.  Because we do not know the cause of her cough previously we will treat for multiple causes today.  If she does not respond to these treatments she will see ent for further evaluation as it is interesting that the cough is present only with talking.  Pt agrees with plan and verbalized understanding.  - benzonatate (TESSALON) 100 MG capsule; Take 2 capsules (200 mg) by mouth 3 times daily as needed for cough.  - azithromycin (ZITHROMAX) 250 MG tablet; Take 2 tablets (500 mg) by mouth daily for 1 day, THEN 1 tablet (250 mg) daily for 4 days.  - omeprazole (PRILOSEC OTC) 20 MG EC tablet; Take 1 tablet (20 mg) by mouth daily.  - Adult ENT  Referral; Future      The longitudinal plan of care for the diagnosis(es)/condition(s) as documented were addressed during this visit. Due to the added complexity in care, I will continue to support Escobar in the subsequent management and with ongoing continuity of care.        Subjective   Escobar is a 33 year old, presenting for the following health issues:  Cough        11/8/2024    10:34 AM   Additional Questions   Roomed by Meenu LEARY     Pt states she has had a cough for about a month that will not go away. States  "sometimes she is struggling to breath when a coughing anticipates.   Has had this cough in the past and omeprazole helped a lot.       Experienced an episode of tingling and numbness in fingers about 2 weeks ago to the point she had trouble picking up a comb. Episode lasted for a couple hours, and that is the only time it happened. It went away after sleeping through the night. Pt states hands are always cold. This is the only time it happened but pt is concerned and unsure what caused it.       History of Present Illness       Headaches:   Since the patient's last clinic visit, headaches are: worsened  The patient is getting headaches:  2  She is not able to do normal daily activities when she has a migraine.  The patient is taking the following rescue/relief medications:  Excedrin   Patient states \"I get some relief\" from the rescue/relief medications.   The patient is taking the following medications to prevent migraines:  Other  In the past 4 weeks, the patient has gone to an Urgent Care or Emergency Room 0 times times due to headaches.    Reason for visit:  Cough    She eats 0-1 servings of fruits and vegetables daily.She consumes 1 sweetened beverage(s) daily.She exercises with enough effort to increase her heart rate 10 to 19 minutes per day.  She exercises with enough effort to increase her heart rate 3 or less days per week.   She is taking medications regularly.       Migraines worsening in severity and frequency.  She has had issues with migraines chronically.  Previously 1-2 times per week.  Now happening almost daily.  She typically just goes to bed when she gets them as they always occur in the evening.  For about 1 month she did start taking excedrin pm but stopped as she wasn't sure if she was having side effects.  She has never taken anything prescription for her migraines.  She continues to work as a .  About 1 year ago she did see a chiropractor as she also has chronic neck pain.  This " "has worsened lately as well.  After seeing the chiropractor for 6 visits her neck pain was much improved and her migraines resolved for awhile as well.  Recently she had 2 episodes where her fingertips were numb.  She had no pain.  These symptoms resolved without intervention.    Also has concerns with her cough.  Present for about 1 month now.  This has occurred in the past as well.  No cough in about 1 year.  She denies any reflux symptoms.  Coughs only if she is coughing.  No chest pain or palpitations or LE edema, but does feel some tightness in her chest.  Cough is dry and tight.  No URI symptoms.  No fever.            Review of Systems  Constitutional, HEENT, cardiovascular, pulmonary, gi and gu systems are negative, except as otherwise noted.      Objective    /67 (BP Location: Right arm, Patient Position: Sitting, Cuff Size: Adult Large)   Pulse 81   Temp 98.3  F (36.8  C) (Oral)   Resp 11   Ht 1.6 m (5' 3\")   Wt 64 kg (141 lb)   LMP 10/16/2024 (Exact Date)   SpO2 100%   Breastfeeding No   BMI 24.98 kg/m    Body mass index is 24.98 kg/m .  Physical Exam   GENERAL: alert and no distress  EYES: Eyes grossly normal to inspection, PERRL and conjunctivae and sclerae normal  HENT: ear canals and TM's normal, nose and mouth without ulcers or lesions  NECK: no adenopathy, no asymmetry, masses, or scars  RESP: lungs clear to auscultation - no rales, rhonchi or wheezes  CV: regular rate and rhythm, normal S1 S2, no S3 or S4, no murmur, click or rub, no peripheral edema   ABDOMEN: soft, nontender, no hepatosplenomegaly, no masses and bowel sounds normal  NEURO: Normal strength and tone, sensory exam grossly normal, mentation intact, cranial nerves 2-12 intact, and rapid alternating movements normal  PSYCH: mentation appears normal, affect normal/bright            Signed Electronically by: ALESSANDRO Mackey Ra, CNP    "